# Patient Record
Sex: MALE | Race: WHITE | Employment: FULL TIME | URBAN - METROPOLITAN AREA
[De-identification: names, ages, dates, MRNs, and addresses within clinical notes are randomized per-mention and may not be internally consistent; named-entity substitution may affect disease eponyms.]

---

## 2017-01-02 ENCOUNTER — OFFICE VISIT (OUTPATIENT)
Dept: UROLOGY | Facility: CLINIC | Age: 57
End: 2017-01-02
Payer: COMMERCIAL

## 2017-01-02 VITALS — RESPIRATION RATE: 12 BRPM | DIASTOLIC BLOOD PRESSURE: 80 MMHG | HEART RATE: 88 BPM | SYSTOLIC BLOOD PRESSURE: 126 MMHG

## 2017-01-02 DIAGNOSIS — C61 PROSTATE CANCER (H): Primary | ICD-10-CM

## 2017-01-02 PROCEDURE — 99024 POSTOP FOLLOW-UP VISIT: CPT | Performed by: UROLOGY

## 2017-01-02 NOTE — NURSING NOTE
"Chief Complaint   Patient presents with     RECHECK       Initial /80 mmHg  Pulse 88  Resp 12 Estimated body mass index is 35.86 kg/(m^2) as calculated from the following:    Height as of 12/9/16: 1.689 m (5' 6.5\").    Weight as of 12/23/16: 102.286 kg (225 lb 8 oz).  BP completed using cuff size: regular  Lita Appiah RN       "

## 2017-01-02 NOTE — MR AVS SNAPSHOT
"              After Visit Summary   2017    Estuardo Bowden    MRN: [id number]           Patient Information     Date Of Birth          1960        Visit Information        Provider Department      2017 10:30 AM Ross Rust MD [facility]        Today's Diagnoses     Prostate cancer (H)    -  1        Follow-ups after your visit        Future tests that were ordered for you today     Open Future Orders        Priority Expected Expires Ordered    PSA tumor marker Routine 2017 1/3/2018 2017            Who to contact     If you have questions or need follow up information about today's clinic visit or your schedule please contact [facility] directly at [phone].  Normal or non-critical lab and imaging results will be communicated to you by MyChart, letter or phone within 4 business days after the clinic has received the results. If you do not hear from us within 7 days, please contact the clinic through [employer] or phone. If you have a critical or abnormal lab result, we will notify you by phone as soon as possible.  Submit refill requests through [email or web address] or call your pharmacy and they will forward the refill request to us. Please allow 3 business days for your refill to be completed.          Additional Information About Your Visit        MyChart Information     [email or web address] lets you send messages to your doctor, view your test results, renew your prescriptions, schedule appointments and more. To sign up, go to [email or web address] . Click on \"Log in\" on the left side of the screen, which will take you to the Welcome page. Then click on \"Sign up Now\" on the right side of the page.     You will be asked to enter the access code listed below, as well as some personal information. Please follow the directions to create your username and password.     Your access code is: [email or web address]  Expires: 3/29/2017  9:09 AM     Your access code will  in 90 days. If " you need help or a new code, please call your [place] clinic or [phone].        Your Vitals Were     Pulse Respirations                88 12           Blood Pressure from Last 3 Encounters:   01/02/17 126/80   12/23/16 122/70   12/09/16 138/82    Weight from Last 3 Encounters:   12/23/16 102.286 kg (225 lb 8 oz)   12/09/16 99.292 kg (218 lb 14.4 oz)   11/15/16 96.435 kg (212 lb 9.6 oz)               Primary Care Provider Office Phone # Fax #    Hernandez Luna [phone]       [facility] [address] [facility] [address]        Thank you!     Thank you for choosing [facility]  for your care. Our goal is always to provide you with excellent care. Hearing back from our patients is one way we can continue to improve our services. Please take a few minutes to complete the written survey that you may receive in the mail after your visit with us. Thank you!             Your Updated Medication List - Protect others around you: Learn how to safely use, store and throw away your medicines at www.disposemymeds.org.          This list is accurate as of: 1/2/17 10:34 AM.  Always use your most recent med list.                   Brand Name Dispense Instructions for use    bacitracin 500 UNIT/GM Oint      Apply 1 each topically       ciprofloxacin 500 MG tablet    CIPRO     Take 500 mg by mouth       docusate sodium 100 MG capsule    COLACE     Take 100 mg by mouth       polyethylene glycol powder    MIRALAX    510 g    Take 17 g (1 capful) by mouth 2 times daily

## 2017-04-12 DIAGNOSIS — C61 PROSTATE CANCER (H): ICD-10-CM

## 2017-04-12 LAB — PSA SERPL-MCNC: 0.28 UG/L (ref 0–4)

## 2017-04-12 PROCEDURE — 36415 COLL VENOUS BLD VENIPUNCTURE: CPT | Performed by: UROLOGY

## 2017-04-12 PROCEDURE — 84153 ASSAY OF PSA TOTAL: CPT | Performed by: UROLOGY

## 2017-04-14 ENCOUNTER — TELEPHONE (OUTPATIENT)
Dept: UROLOGY | Facility: OTHER | Age: 57
End: 2017-04-14

## 2017-04-14 DIAGNOSIS — R30.0 DYSURIA: ICD-10-CM

## 2017-04-14 DIAGNOSIS — R30.0 DYSURIA: Primary | ICD-10-CM

## 2017-04-14 LAB
ALBUMIN UR-MCNC: NEGATIVE MG/DL
APPEARANCE UR: CLEAR
BACTERIA #/AREA URNS HPF: ABNORMAL /HPF
BILIRUB UR QL STRIP: NEGATIVE
COLOR UR AUTO: YELLOW
GLUCOSE UR STRIP-MCNC: NEGATIVE MG/DL
HGB UR QL STRIP: ABNORMAL
KETONES UR STRIP-MCNC: NEGATIVE MG/DL
LEUKOCYTE ESTERASE UR QL STRIP: ABNORMAL
NITRATE UR QL: POSITIVE
PH UR STRIP: 5.5 PH (ref 5–7)
RBC #/AREA URNS AUTO: ABNORMAL /HPF (ref 0–2)
SP GR UR STRIP: 1.02 (ref 1–1.03)
URN SPEC COLLECT METH UR: ABNORMAL
UROBILINOGEN UR STRIP-ACNC: 0.2 EU/DL (ref 0.2–1)
WBC #/AREA URNS AUTO: ABNORMAL /HPF (ref 0–2)

## 2017-04-14 PROCEDURE — 87086 URINE CULTURE/COLONY COUNT: CPT | Performed by: UROLOGY

## 2017-04-14 PROCEDURE — 87088 URINE BACTERIA CULTURE: CPT | Performed by: UROLOGY

## 2017-04-14 PROCEDURE — 81001 URINALYSIS AUTO W/SCOPE: CPT | Performed by: UROLOGY

## 2017-04-14 PROCEDURE — 87186 SC STD MICRODIL/AGAR DIL: CPT | Performed by: UROLOGY

## 2017-04-14 NOTE — TELEPHONE ENCOUNTER
Called and spoke to patient.  Per Dr. Rust patient to increase fluid intake and leave a urine sample.  Patient agrees with this plan will call with results. Lita Appiah RN

## 2017-04-14 NOTE — TELEPHONE ENCOUNTER
Reason for call:  Symptom  Reason for call:  Patient reporting a symptom    Symptom or request: pain when urinating    Duration (how long have symptoms been present): couple weeks    Have you been treated for this before? Yes    Additional comments: pt had prostate removal in December and for a couple weeks he has been having some pain when urinating. He wants to know if this is something concerning or not. He does  in 2 weeks but it kind of hurts so he wants to be checked out sooner.     Phone Number patient can be reached at:  Cell number on file:    Telephone Information:   Mobile [phone]       Best Time:  anytime    Can we leave a detailed message on this number:  YES    Call taken on 4/14/2017 at 10:36 AM by Korin Casas

## 2017-04-17 DIAGNOSIS — R30.0 DYSURIA: Primary | ICD-10-CM

## 2017-04-17 LAB
BACTERIA SPEC CULT: ABNORMAL
MICRO REPORT STATUS: ABNORMAL
MICROORGANISM SPEC CULT: ABNORMAL
SPECIMEN SOURCE: ABNORMAL

## 2017-04-17 RX ORDER — CEPHALEXIN 500 MG/1
500 CAPSULE ORAL 3 TIMES DAILY
Qty: 15 CAPSULE | Refills: 0 | Status: SHIPPED | OUTPATIENT
Start: 2017-04-17 | End: 2017-04-22

## 2017-04-26 ENCOUNTER — OFFICE VISIT (OUTPATIENT)
Dept: UROLOGY | Facility: OTHER | Age: 57
End: 2017-04-26
Payer: COMMERCIAL

## 2017-04-26 VITALS — HEART RATE: 76 BPM | DIASTOLIC BLOOD PRESSURE: 84 MMHG | SYSTOLIC BLOOD PRESSURE: 130 MMHG

## 2017-04-26 DIAGNOSIS — C61 PROSTATE CANCER (H): Primary | ICD-10-CM

## 2017-04-26 DIAGNOSIS — R39.198 SLOWING OF URINARY STREAM: ICD-10-CM

## 2017-04-26 PROCEDURE — 52000 CYSTOURETHROSCOPY: CPT | Performed by: UROLOGY

## 2017-04-26 PROCEDURE — 51798 US URINE CAPACITY MEASURE: CPT | Performed by: UROLOGY

## 2017-04-26 PROCEDURE — 99214 OFFICE O/P EST MOD 30 MIN: CPT | Mod: 25 | Performed by: UROLOGY

## 2017-04-26 NOTE — MR AVS SNAPSHOT
After Visit Summary   4/26/2017    Estuardo Bowden    MRN: [id number]           Patient Information     Date Of Birth          1960        Visit Information        Provider Department      4/26/2017 8:15 AM Ross Rust MD [facility]        Today's Diagnoses     Prostate cancer (H)    -  1    Slowing of urinary stream           Follow-ups after your visit        Additional Services     RAD ONCOLOGY REFERRAL       Your provider has referred you to: [facility] [phone]    Please be aware that coverage of these services is subject to the terms and limitations of your health insurance plan.  Call member services at your health plan with any benefit or coverage questions.      Please bring the following with you to your appointment:    (1) Any X-Rays, CTs or MRIs which have been performed.  Contact the facility where they were done to arrange for  prior to your scheduled appointment.    (2) List of current medications   (3) This referral request   (4) Any documents/labs given to you for this referral                  Your next 10 appointments already scheduled     May 11, 2017   Procedure with Ross Rust MD   [facility] (--)    [address] [place] [facility] [address] [phone]              Who to contact     If you have questions or need follow up information about today's clinic visit or your schedule please contact [facility] directly at [phone].  Normal or non-critical lab and imaging results will be communicated to you by MyChart, letter or phone within 4 business days after the clinic has received the results. If you do not hear from us within 7 days, please contact the clinic through MyChart or phone. If you have a critical or abnormal lab result, we will notify you by phone as soon as possible.  Submit refill requests through [employer] or call your pharmacy and they will forward the refill request  "to us. Please allow 3 business days for your refill to be completed.          Additional Information About Your Visit        MyChart Information     [employer] lets you send messages to your doctor, view your test results, renew your prescriptions, schedule appointments and more. To sign up, go to [email or web address] . Click on \"Log in\" on the left side of the screen, which will take you to the Welcome page. Then click on \"Sign up Now\" on the right side of the page.     You will be asked to enter the access code listed below, as well as some personal information. Please follow the directions to create your username and password.     Your access code is: [email or web address]  Expires: 2017  8:33 AM     Your access code will  in 90 days. If you need help or a new code, please call your [place] clinic or [phone].        Care EveryWhere ID     This is your Care EveryWhere ID. This could be used by other organizations to access your [facility] medical records  [id number]        Your Vitals Were     Pulse                   76            Blood Pressure from Last 3 Encounters:   17 130/84   17 126/80   16 122/70    Weight from Last 3 Encounters:   16 102.3 kg (225 lb 8 oz)   16 99.3 kg (218 lb 14.4 oz)   11/15/16 96.4 kg (212 lb 9.6 oz)              We Performed the Following     CYSTOURETHROSCOPY     MEASURE POST-VOID RESIDUAL URINE/BLADDER CAPACITY, US NON-IMAGING     RAD ONCOLOGY REFERRAL        Primary Care Provider Office Phone # Fax #    Hernandez Luna [phone]       [facility] [place] [address] [facility] [address]        Thank you!     Thank you for choosing [facility]  for your care. Our goal is always to provide you with excellent care. Hearing back from our patients is one way we can continue to improve our services. Please take a few minutes to complete the written survey that you may receive in the mail after your " visit with us. Thank you!             Your Updated Medication List - Protect others around you: Learn how to safely use, store and throw away your medicines at www.disposemymeds.org.          This list is accurate as of: 4/26/17  8:33 AM.  Always use your most recent med list.                   Brand Name Dispense Instructions for use    bacitracin 500 UNIT/GM Oint      Apply 1 each topically Reported on 4/26/2017       docusate sodium 100 MG capsule    COLACE     Take 100 mg by mouth Reported on 4/26/2017       polyethylene glycol powder    MIRALAX    510 g    Take 17 g (1 capful) by mouth 2 times daily

## 2017-04-26 NOTE — NURSING NOTE
"Chief Complaint   Patient presents with     Results       Initial /84 (BP Location: Left arm, Patient Position: Chair, Cuff Size: Adult Large)  Pulse 76 Estimated body mass index is 35.85 kg/(m^2) as calculated from the following:    Height as of 12/9/16: 1.689 m (5' 6.5\").    Weight as of 12/23/16: 102.3 kg (225 lb 8 oz).  Medication Reconciliation: complete   Brittany Pope CMA      "

## 2017-04-26 NOTE — PROGRESS NOTES
Bladder Scan performed. 304 mL maximum residual urine detected after 3 scans. MD informed.    Sade Catherine CMA ([facility])

## 2017-04-26 NOTE — PROGRESS NOTES
Chief Complaint   Patient presents with     Results       Estuardo Bowden is a 56 year old male who presents today for follow up of   Chief Complaint   Patient presents with     Results    f/u post RRP.  He has recent UTI which was treated.  He c/o frequency and slow urinary stream.  Recent psa is 0.28.    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 No family hx of      Coronary Artery Disease No family hx of      Breast Cancer No family hx of      Ovarian Cancer No family hx of      Mental Illness No family hx of      Asthma No family hx of      Obesity No family hx of      Unknown/Adopted No family hx of      Anesthesia Reaction No family hx of      CEREBROVASCULAR DISEASE No family hx of      Other Cancer No family hx of      Prostate Cancer No family hx of      Cancer - colorectal No family hx of      Hyperlipidemia No family hx of      Known Genetic Syndrome No family hx of      OSTEOPOROSIS No family hx of      Depression No family hx of      Anxiety Disorder No family hx of       MENTAL ILLNESS No family hx of      Substance Abuse No family hx of      Thyroid Disease No family hx of      Genetic Disorder No family hx of      Colon Cancer No family hx of      Hypertension Mother      Social History     Social History     Marital Status:      Spouse Name: N/A     Number of Children: 2     Years of Education: N/A     Occupational History      [employer] [place]     Social History Main Topics     Smoking status: Former Smoker     Types: Dip, chew, snus or snuff     Smokeless tobacco: Current User     Types: Chew      Comment: 1 tin every 3 days     Alcohol Use: 0.0 oz/week     0 Standard drinks or equivalent per week      Comment: 6-12 beers per week     Drug Use: No     Sexual Activity:     Partners: Female      Comment: no protection     Other Topics Concern     Parent/Sibling W/ Cabg, Mi Or Angioplasty Before 65f 55m? No      Service No     Blood Transfusions No     Caffeine Concern No     Occupational Exposure No     Hobby Hazards No     Sleep Concern No     Stress Concern No     Weight Concern No     Special Diet No     Back Care No     Exercise Yes     [employer] 12+ times per month     Seat Belt Yes     Self-Exams Yes     Social History Narrative       REVIEW OF SYSTEMS  =================  C: NEGATIVE for fever, chills, change in weight  I: NEGATIVE for worrisome rashes, moles or lesions  E/M: NEGATIVE for ear, mouth and throat problems  R: NEGATIVE for significant cough or SHORTNESS OF BREATH,   CV: NEGATIVE for chest pain, palpitations or peripheral edema  GI: NEGATIVE for nausea, abdominal pain, heartburn, or change in bowel habits  NEURO: NEGATIVE any motor/sensory changes  PSYCH: NEGATIVE for recent mood disorder    Physical Exam:  /84 (BP Location: Left arm, Patient Position: Chair, Cuff Size: Adult Large)  Pulse 76   Patient is pleasant, in no acute distress, good general condition.  Lung: no evidence of respiratory distress    Abdomen: Soft, nondistended,  non tender. No masses. No rebound or guarding.   Exam: incision c/d/i.  Bladder scan over 300 ml  Skin: Warm and dry.  No redness.  Psych: normal mood and affect  Neuro: alert and oriented  Final Diagnosis   A) LYMPH NODES, RIGHT PELVIC, REGIONAL DISSECTION:  One lymph node, negative for metastatic carcinoma (0/1)    B) LYMPH NODES, LEFT PELVIC, REGIONAL DISSECTION:  One lymph node, negative for metastatic carcinoma (0/1)    C) PROSTATE, RADICAL PROSTATECTOMY:  1. Prostatic adenocarcinoma, Agustina s grade 4+4 (8/10)  2. Extraprostatic extension by tumor is present at the left base  3. Tumor involves the muscularis of the seminal vesicle bilaterally  4. The surgical margins are negative for tumor  5. Negative for lymphatic/vascular invasion by tumor  6. Please see prostate cancer staging parameters below    Cysto done today    Patient is draped and prepped.  Flexible cystoscopy placed under direct vision.      The anterior urethra is normal   The prostatic urethra is missing with bladder neck contracture seen about 12 F.          Assessment/Plan:  (C61) Prostate cancer (H)  (primary encounter diagnosis)  Comment: discussed psa result  Plan: referral to Radiation Oncology for salvage XRT           Discussed hormonal therapy for 2 years also    (R31.512) Slowing of urinary stream  Comment: bladder neck contracture  Plan: cysto and balloon dilation

## 2017-04-26 NOTE — PROGRESS NOTES
[facility] [place]  [address], [address]  [place] [address]  [phone]  Dept: [phone]    PRE-OP EVALUATION:  Today's date: 5/3/2017    Estuardo Bowden (: 1960) presents for pre-operative evaluation assessment as requested by Dr. Rust.  He requires evaluation and anesthesia risk assessment prior to undergoing surgery/procedure for treatment of postoperative urethral stricture following prostatectomy for prostate cancer. .  Proposed procedure: Cystoscopy with dilation of the urethra    Date of Surgery/ Procedure: 2017  Time of Surgery/ Procedure: 1:30pm  Hospital/Surgical Facility: [facility]  Fax number for surgical facility:   Primary Physician: Hernandez Luna  Type of Anesthesia Anticipated: General    Patient has a Health Care Directive or Living Will:  YES -on file     1. NO - Do you have a history of heart attack, stroke, stent, bypass or surgery on an artery in the head, neck, heart or legs?  2. NO - Do you ever have any pain or discomfort in your chest?  3. NO - Do you have a history of  Heart Failure?  4. NO - Are you troubled by shortness of breath when: walking on the level, up a slight hill or at night?  5. NO - Do you currently have a cold, bronchitis or other respiratory infection?  6. NO - Do you have a cough, shortness of breath or wheezing?  7. NO - Do you sometimes get pains in the calves of your legs when you walk?  8. NO - Do you or anyone in your family have previous history of blood clots?  9. NO - Do you or does anyone in your family have a serious bleeding problem such as prolonged bleeding following surgeries or cuts?  10. NO - Have you ever had problems with anemia or been told to take iron pills?  11. NO - Have you had any abnormal blood loss such as black, tarry or bloody stools, or abnormal vaginal bleeding?  12. YES - HAVE YOU EVER HAD A BLOOD TRANSFUSION? Following the prostatectomy required a blood transfusion   13.  NO - Have you or any of your relatives ever had problems with anesthesia?  14. NO - Do you have sleep apnea, excessive snoring or daytime drowsiness?  15. NO - Do you have any prosthetic heart valves?  16. NO - Do you have prosthetic joints?  17. NO - Is there any chance that you may be pregnant?      HPI:                                                      Brief HPI related to upcoming procedure:     Estuardo Bowden is a 57 y/o patient seen for preoperative evaluation prior to undergoing cystoscopy and urethral dilation for treatment of a symptomatic urethral stricture. He has had ongoing symptoms of obstruction since his prostatectomy in December 2016 for prostate cancer. He will also begin post surgery radiation this week to the surgical bed.      See problem list for active medical problems.  Problems all longstanding and stable, except as noted/documented.  See ROS for pertinent symptoms related to these conditions.                                                                                                  .    MEDICAL HISTORY:                                                      Patient Active Problem List    Diagnosis Date Noted     S/P complete repair of rotator cuff 10/18/2016     Priority: Medium     Biceps tendonitis, right 09/08/2016     Priority: Medium     AC (acromioclavicular) joint arthritis 09/08/2016     Priority: Medium     Complete tear of right rotator cuff 09/08/2016     Priority: Medium     Prostate cancer (H) 05/14/2015     Priority: Medium     ED (erectile dysfunction) 04/22/2015     Priority: Medium     Tobacco use disorder 06/11/2013     Priority: Medium     CARDIOVASCULAR SCREENING; LDL GOAL LESS THAN 130 10/31/2010     Priority: Medium      Past Medical History:   Diagnosis Date     ED (erectile dysfunction)      Guillain-[place] syndrome (H) 1999    no sequale     Prostate cancer (H) 5/2015     Past Surgical History:   Procedure Laterality Date     ARTHROTOMY SHOULDER, ROTATOR CUFF  "REPAIR, COMBINED Right 10/7/2016    Right RCR, Russell Farley     NO HISTORY OF SURGERY       SIGMOIDOSCOPY FLEXIBLE  7/15/2013    Procedure: SIGMOIDOSCOPY FLEXIBLE;  Sigmoidoscopy Flexible;  Surgeon: Dusty Chang MD;  Location:  GI     No current outpatient prescriptions on file.     OTC products: None, except as noted above    No Known Allergies   Latex Allergy: NO    Social History   Substance Use Topics     Smoking status: Former Smoker     Types: Dip, chew, snus or snuff     Smokeless tobacco: Current User     Types: Chew      Comment: 1 tin every 3 days     Alcohol use 0.0 oz/week     0 Standard drinks or equivalent per week      Comment: 6-12 beers per week     History   Drug Use No       REVIEW OF SYSTEMS:                                                    C: NEGATIVE for fever, chills, change in weight  I: NEGATIVE for worrisome rashes, moles or lesions  E: NEGATIVE for vision changes or irritation  E/M: NEGATIVE for ear, mouth and throat problems  R: NEGATIVE for significant cough or SOB  CV: NEGATIVE for chest pain, palpitations or peripheral edema  CV: No known cardiac issues noted.  GI: NEGATIVE for nausea, abdominal pain, heartburn, or change in bowel habits   male :As above  M: NEGATIVE for significant arthralgias or myalgia  N: NEGATIVE for weakness, dizziness or paresthesias  E: NEGATIVE for temperature intolerance, skin/hair changes  H: NEGATIVE for bleeding problems  P: NEGATIVE for changes in mood or affect    EXAM:                                                    /70 (BP Location: Left arm, Cuff Size: Adult Large)  Pulse 84  Temp 97.5  F (36.4  C) (Temporal)  Resp 20  Ht 5' 7\" (1.702 m)  Wt 213 lb (96.6 kg)  BMI 33.36 kg/m2    GENERAL APPEARANCE: healthy, alert and no distress    GENERAL APPEARANCE: over weight     EYES: EOMI,  PERRL     HENT: ear canals and TM's normal and nose and mouth without ulcers or lesions     NECK: no adenopathy, no asymmetry, masses, or scars " and thyroid normal to palpation     RESP: lungs clear to auscultation - no rales, rhonchi or wheezes     CV: regular rates and rhythm, normal S1 S2, no S3 or S4 and no murmur, click or rub     ABDOMEN:  soft, nontender, no HSM or masses and bowel sounds normal     Rectal exam: deferred     GU_male: testicles normal without atrophy or masses and no hernias     MS: extremities normal- no gross deformities noted, no evidence of inflammation in joints, FROM in all extremities.     SKIN: no suspicious lesions or rashes     NEURO: Normal strength and tone, sensory exam grossly normal, mentation intact and speech normal     PSYCH: mentation appears normal. and affect normal/bright     LYMPHATICS: No axillary, cervical, or supraclavicular nodes    DIAGNOSTICS:                                                    EKG: Not indicated due to non-vascular surgery and low risk of event (age <65 and without cardiac risk factors)    Recent Labs   Lab Test  12/23/16   1203  12/09/16   1415  04/22/15   0829   03/24/10   0953   HGB  8.7*  15.1  16.5   < >  16.5   PLT  245  242  225   < >  213   NA   --    --   138   --   141   POTASSIUM   --    --   4.2   --   4.7   CR   --    --   1.01   --   1.01    < > = values in this interval not displayed.      CBC RESULTS:   Recent Labs   Lab Test  05/03/17   0959   WBC  7.4   RBC  4.74   HGB  15.3   HCT  44.2   MCV  93   MCH  32.3   MCHC  34.6   RDW  15.3*   PLT  243       IMPRESSION:                                                    Reason for surgery/procedure:     Postoperative urethral stricture // Cystoscopy with urethral dilation    Diagnosis/reason for consult:     (Z01.818) Preop general physical exam  (primary encounter diagnosis)  Comment: As noted--cleared for proposed surgery  Plan: CBC with platelets            (N99.114) Postprocedural male urethral stricture  Comment: As noted  Plan: proposed surgery    (C61) Prostate cancer (H)  Comment: Post prostatectomy  Plan: Local field  radiation planned     (D64.89) Anemia due to other cause  Comment: Checking levels--was from blood loss in surgery  Plan: CBC with platelets                The proposed surgical procedure is considered INTERMEDIATE risk.    REVISED CARDIAC RISK INDEX  The patient has the following serious cardiovascular risks for perioperative complications such as (MI, PE, VFib and 3  AV Block):  No serious cardiac risks  INTERPRETATION: 0 risks: Class I (very low risk - 0.4% complication rate)    The patient has the following additional risks for perioperative complications:  No identified additional risks        RECOMMENDATIONS:                                                      --Consult hospital rounder / IM to assist post-op medical management      APPROVAL GIVEN to proceed with proposed procedure, without further diagnostic evaluation       Signed Electronically by: Hernandez Luna MD    Copy of this evaluation report is provided to requesting physician.    [facility] Preop Guidelines

## 2017-05-03 ENCOUNTER — OFFICE VISIT (OUTPATIENT)
Dept: FAMILY MEDICINE | Facility: CLINIC | Age: 57
End: 2017-05-03
Payer: COMMERCIAL

## 2017-05-03 VITALS
BODY MASS INDEX: 33.43 KG/M2 | RESPIRATION RATE: 20 BRPM | HEART RATE: 84 BPM | WEIGHT: 213 LBS | SYSTOLIC BLOOD PRESSURE: 138 MMHG | DIASTOLIC BLOOD PRESSURE: 70 MMHG | HEIGHT: 67 IN | TEMPERATURE: 97.5 F

## 2017-05-03 DIAGNOSIS — Z01.818 PREOP GENERAL PHYSICAL EXAM: Primary | ICD-10-CM

## 2017-05-03 DIAGNOSIS — D64.89 ANEMIA DUE TO OTHER CAUSE: ICD-10-CM

## 2017-05-03 DIAGNOSIS — C61 PROSTATE CANCER (H): ICD-10-CM

## 2017-05-03 DIAGNOSIS — N99.114 POSTPROCEDURAL MALE URETHRAL STRICTURE: ICD-10-CM

## 2017-05-03 LAB
ERYTHROCYTE [DISTWIDTH] IN BLOOD BY AUTOMATED COUNT: 15.3 % (ref 10–15)
HCT VFR BLD AUTO: 44.2 % (ref 40–53)
HGB BLD-MCNC: 15.3 G/DL (ref 13.3–17.7)
MCH RBC QN AUTO: 32.3 PG (ref 26.5–33)
MCHC RBC AUTO-ENTMCNC: 34.6 G/DL (ref 31.5–36.5)
MCV RBC AUTO: 93 FL (ref 78–100)
PLATELET # BLD AUTO: 243 10E9/L (ref 150–450)
RBC # BLD AUTO: 4.74 10E12/L (ref 4.4–5.9)
WBC # BLD AUTO: 7.4 10E9/L (ref 4–11)

## 2017-05-03 PROCEDURE — 85027 COMPLETE CBC AUTOMATED: CPT | Performed by: FAMILY MEDICINE

## 2017-05-03 PROCEDURE — 99214 OFFICE O/P EST MOD 30 MIN: CPT | Performed by: FAMILY MEDICINE

## 2017-05-03 PROCEDURE — 36415 COLL VENOUS BLD VENIPUNCTURE: CPT | Performed by: FAMILY MEDICINE

## 2017-05-03 ASSESSMENT — PAIN SCALES - GENERAL: PAINLEVEL: NO PAIN (0)

## 2017-05-03 NOTE — MR AVS SNAPSHOT
After Visit Summary   5/3/2017    Estuardo Bowden    MRN: [id number]           Patient Information     Date Of Birth          1960        Visit Information        Provider Department      5/3/2017 9:40 AM Hernandez Luna MD [facility]        Today's Diagnoses     Preop general physical exam    -  1    Postprocedural male urethral stricture        Prostate cancer (H)        Anemia due to other cause          Care Instructions      Before Your Surgery      Call your surgeon if there is any change in your health. This includes signs of a cold or flu (such as a sore throat, runny nose, cough, rash or fever).    Do not smoke, drink alcohol or take over the counter medicine (unless your surgeon or primary care doctor tells you to) for the 24 hours before and after surgery.    If you take prescribed drugs: Follow your doctor s orders about which medicines to take and which to stop until after surgery.    Eating and drinking prior to surgery: follow the instructions from your surgeon    Take a shower or bath the night before surgery. Use the soap your surgeon gave you to gently clean your skin. If you do not have soap from your surgeon, use your regular soap. Do not shave or scrub the surgery site.  Wear clean pajamas and have clean sheets on your bed.         Follow-ups after your visit        Your next 10 appointments already scheduled     May 10, 2017 10:00 AM CDT   [facility] Visit [facility] Celestine Gray MD   [facility]    [id number] [place] [address]   [phone]            May 10, 2017 11:30 AM CDT   Ct Sim with Celestine Gray MD, MG RT SIMULATION   [facility]    [address] [place] [address]   [phone]            May 11, 2017   Procedure with Ross Rust MD   [facility])    [address] [place], [place] [address]Britta Comergabriel [place]  "[id number] [phone]              Who to contact     If you have questions or need follow up information about today's clinic visit or your schedule please contact [facility] directly at [phone].  Normal or non-critical lab and imaging results will be communicated to you by MyChart, letter or phone within 4 business days after the clinic has received the results. If you do not hear from us within 7 days, please contact the clinic through MyChart or phone. If you have a critical or abnormal lab result, we will notify you by phone as soon as possible.  Submit refill requests through [employer] or call your pharmacy and they will forward the refill request to us. Please allow 3 business days for your refill to be completed.          Additional Information About Your Visit        [employer] Information     [employer] lets you send messages to your doctor, view your test results, renew your prescriptions, schedule appointments and more. To sign up, go to [email or web address] . Click on \"Log in\" on the left side of the screen, which will take you to the Welcome page. Then click on \"Sign up Now\" on the right side of the page.     You will be asked to enter the access code listed below, as well as some personal information. Please follow the directions to create your username and password.     Your access code is: [email or web address]  Expires: 2017  8:33 AM     Your access code will  in 90 days. If you need help or a new code, please call your [facility] clinic or [phone].        Care EveryWhere ID     This is your Care EveryWhere ID. This could be used by other organizations to access your [facility] medical records  [id number]        Your Vitals Were     Pulse Temperature Respirations Height BMI (Body Mass Index)       84 97.5  F (36.4  C) (Temporal) 20 5' 7\" (1.702 m) 33.36 kg/m2        Blood Pressure from Last 3 Encounters:   17 138/70   17 130/84   17 126/80    Weight from Last " 3 Encounters:   05/03/17 213 lb (96.6 kg)   12/23/16 225 lb 8 oz (102.3 kg)   12/09/16 218 lb 14.4 oz (99.3 kg)              We Performed the Following     CBC with platelets          Today's Medication Changes          These changes are accurate as of: 5/3/17  9:57 AM.  If you have any questions, ask your nurse or doctor.               Stop taking these medicines if you haven't already. Please contact your care team if you have questions.     bacitracin 500 UNIT/GM Oint   Stopped by:  Hernandez Luna MD           docusate sodium 100 MG capsule   Commonly known as:  COLACE   Stopped by:  Hernandez Luna MD           polyethylene glycol powder   Commonly known as:  MIRALAX   Stopped by:  Hernandez Luna MD                    Primary Care Provider Office Phone # Fax #    Hernandez Luna [phone]       [facility] [address] [place] [address]        Thank you!     Thank you for choosing [facility]  for your care. Our goal is always to provide you with excellent care. Hearing back from our patients is one way we can continue to improve our services. Please take a few minutes to complete the written survey that you may receive in the mail after your visit with us. Thank you!             Your Updated Medication List - Protect others around you: Learn how to safely use, store and throw away your medicines at www.disposemymeds.org.      Notice  As of 5/3/2017  9:57 AM    You have not been prescribed any medications.

## 2017-05-03 NOTE — NURSING NOTE
"Chief Complaint   Patient presents with     Pre-Op Exam     Panel Management     MyChart, Tobacco Cessation       Initial /70 (BP Location: Left arm, Cuff Size: Adult Large)  Pulse 84  Temp 97.5  F (36.4  C) (Temporal)  Resp 20  Ht 5' 7\" (1.702 m)  Wt 213 lb (96.6 kg)  BMI 33.36 kg/m2 Estimated body mass index is 33.36 kg/(m^2) as calculated from the following:    Height as of this encounter: 5' 7\" (1.702 m).    Weight as of this encounter: 213 lb (96.6 kg).  Medication Reconciliation: complete       Chas Wells MA    "

## 2017-05-10 ENCOUNTER — OFFICE VISIT (OUTPATIENT)
Dept: RADIATION ONCOLOGY | Facility: CLINIC | Age: 57
End: 2017-05-10
Payer: COMMERCIAL

## 2017-05-10 ENCOUNTER — APPOINTMENT (OUTPATIENT)
Dept: RADIATION ONCOLOGY | Facility: CLINIC | Age: 57
End: 2017-05-10
Payer: COMMERCIAL

## 2017-05-10 ENCOUNTER — ANESTHESIA EVENT (OUTPATIENT)
Dept: SURGERY | Facility: AMBULATORY SURGERY CENTER | Age: 57
End: 2017-05-10

## 2017-05-10 VITALS
DIASTOLIC BLOOD PRESSURE: 80 MMHG | TEMPERATURE: 98.4 F | WEIGHT: 218.8 LBS | BODY MASS INDEX: 34.34 KG/M2 | SYSTOLIC BLOOD PRESSURE: 148 MMHG | HEART RATE: 85 BPM | OXYGEN SATURATION: 94 % | HEIGHT: 67 IN | RESPIRATION RATE: 18 BRPM

## 2017-05-10 DIAGNOSIS — C61 MALIGNANT NEOPLASM OF PROSTATE (H): Primary | ICD-10-CM

## 2017-05-10 PROCEDURE — 77263 THER RADIOLOGY TX PLNG CPLX: CPT | Performed by: RADIOLOGY

## 2017-05-10 PROCEDURE — 99204 OFFICE O/P NEW MOD 45 MIN: CPT | Mod: 25 | Performed by: RADIOLOGY

## 2017-05-10 PROCEDURE — 77334 RADIATION TREATMENT AID(S): CPT | Performed by: RADIOLOGY

## 2017-05-10 ASSESSMENT — ENCOUNTER SYMPTOMS
FEVER: 0
FREQUENCY: 1
HEMATURIA: 0
MUSCULOSKELETAL NEGATIVE: 1
NEUROLOGICAL NEGATIVE: 1
GASTROINTESTINAL NEGATIVE: 1
DYSURIA: 1
DIAPHORESIS: 0
MEMORY LOSS: 0
HALLUCINATIONS: 0
NERVOUS/ANXIOUS: 1
RESPIRATORY NEGATIVE: 1
FLANK PAIN: 0
CARDIOVASCULAR NEGATIVE: 1
CHILLS: 0
EYES NEGATIVE: 1
INSOMNIA: 0
DEPRESSION: 0
WEAKNESS: 0
WEIGHT LOSS: 0

## 2017-05-10 ASSESSMENT — LIFESTYLE VARIABLES: SUBSTANCE_ABUSE: 0

## 2017-05-10 ASSESSMENT — PAIN SCALES - GENERAL: PAINLEVEL: NO PAIN (0)

## 2017-05-10 NOTE — PROGRESS NOTES
RADIATION ONCOLOGY CONSULT NOTE  Date of Visit: May 10, 2017    Estuardo Bowden  MRN: [id number]  : 1960    Estuardo Bowden is being seen today for initial consultation at the request of Dr. Ross Rust for consideration of radiation therapy for high risk prostate cancer, s/p prostatectomy. All pertinent labs, imaging, and pathology findings have been reviewed.       HISTORY OF PRESENT ILLNESS:  Mr. Bowden is a 57 year old male who had an elevated PSA in .  Bx confirmed Agustina 6 (3+3) CaP w/ no PNI and no LVI.  PSA continued to rise, prompting another bx in :  [place] 7(3+4) now w/ PNI.  No LVI seen.  Dr. Rust recommended surgery and the patient underwent radical prostatectomy Dec., 2016.  He was seen soon after surgery for a post op check and then released for 4 months.  When he returned to Dr. Rust in , his PSA was 0.28 (only PSA after surgery) and was referred for consideration of postoperative radiation.  The final pathology from surgery is outlined below.     He in unable to control urine flow, with nearly constant leaking for which he wears a pad.  He only gets up 1-2 times per night to urinate, but has constant leaking during the night and is soaked in the morning. He is unable to maintain an erection.      He is scheduled to undergo cystoscopy and dilatation tomorrow and is hopeful that his sx will improve.        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        FAMILY HISTORY:  Family History   Problem Relation Age of Onset     DIABETES Maternal Grandmother      Hypertension Mother      Prostate Cancer Maternal Grandfather      C.A.D. No family hx of      Coronary Artery Disease No family hx of      Breast Cancer No family hx of      Ovarian Cancer No family hx of      Mental Illness No family hx of      Asthma No family hx of      Obesity No family hx of      Unknown/Adopted No family hx of      Anesthesia Reaction No family hx of      CEREBROVASCULAR DISEASE No family hx of      Other Cancer No family hx of      Cancer - colorectal No family hx of      Hyperlipidemia No family hx of      Known Genetic Syndrome No family hx of      OSTEOPOROSIS No family hx of      Depression No family hx of      Anxiety Disorder No family hx of      MENTAL ILLNESS No family hx of      Substance Abuse No family hx of      Thyroid Disease No family hx of      Genetic Disorder No family hx of      Colon Cancer No family hx of        SOCIAL HISTORY:  Social History     Social History     Marital status:      Spouse name: N/A     Number of children: 2     Years of education: N/A     Occupational History      [employer] [place]     Social History Main Topics     Smoking status: Never Smoker     Smokeless tobacco: Current User     Types: Chew      Comment: 1 tin every 3 days     Alcohol use 7.2 oz/week     12 Cans of beer, 0 Standard drinks or equivalent per week      Comment: 12 beers per week     Drug use: No     Sexual activity: Yes     Partners: Female     Birth control/ protection: None     Other Topics Concern     Parent/Sibling W/ Cabg, Mi Or  "Angioplasty Before 65f 55m? No      Service No     Blood Transfusions No     Caffeine Concern No     Occupational Exposure No     Hobby Hazards No     Sleep Concern No     Stress Concern No     Weight Concern No     Special Diet No     Back Care No     Exercise Yes     [employer] 12+ times per month     Seat Belt Yes     Self-Exams Yes     Social History Narrative     The patient's maternal grandfather was diagnosed w/ prostate cancer at some point prior to his death in his late 80's.  There's no other known family history of malignancy.      REVIEW OF SYSTEMS: A 10 point review of systems was obtained. Pertinent findings are noted in the HPI and are otherwise unremarkable.       PHYSICAL EXAM:  VITALS: /80 (BP Location: Left arm, Patient Position: Chair, Cuff Size: Adult Large)  Pulse 85  Temp 98.4  F (36.9  C) (Oral)  Resp 18  Ht 1.702 m (5' 7\")  Wt 99.2 kg (218 lb 12.8 oz)  SpO2 94%  BMI 34.27 kg/m2  GEN: Appears well, alert, oriented, and in NAD  HEENT: NCAT, PERRL, EOMI, normal conjunctiva, MMM, no thrush  NECK: Supple, full ROM, no cervical or clavicular lymphadenopathy  CV: RRR, no murmurs/rubs/gallops, warm and well-perfused  RESP: CTAB, no wheezes/rales/rhonchi, good aeration throughout all lung fields, breathing comfortably on room air  ABDOMEN: Soft, NT, ND, bowel sounds present  : Nl genitalia.  No rectal exam performed today.    SKIN: Normal color and turgor  NEURO: No focal deficits, CN 3-12 grossly intact, normal and symmetric motor and sensory exam in bilateral upper and lower extremities, normal gait  PSYCH: Appropriate mood and affect    ECOG PERFORMANCE STATUS: 1    Radiographic Studies:  Bone scan 7/22/16 revealed no evidence of bony mets.      Pathology Report:  Pathology from surgery Dec., 2016 revealed Agustina 8 (4+4) prostate cancer with extraprostatic extension and involvement of the SV musculature bilaterally.  No LVI,  Margins neg, 2 nodes neg.      Laboratory Studies:  " PSA postop (April 2017)  0.28.  PSA over 20 prior to prostatectomy.    IMPRESSION:  In summary, Mr. Bowden is a 57 year old male who presents with stage [id number] prostate cancer, who's PSA has not fallen into undetectable range and who is at high risk for local recurrence given [place] score, extraprostatic extension and SV involvement.  He's here today to discuss postoperative radiation.        RECOMMENDATION:  I talked w/ Mr. Bowden and his wife after prostate cancer in general and then about his specific situation. He is at high risk for recurrence and I have recommended prostate bed irradiation to start ASAP.  We will simulate him with a full bladder today using a penile clamp.  Hopefully, the procedure tomorrow will result in better urinary control and he'll be able to maintain a full bladder during daily treatments.  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  That may be the best we'll be able to do to protect his bladder.  We talked about the potential benefits and risks of treatment and he wishes to proceed.  We talked about logistics of treatment.  He works nearby which will make this a bit easier for him.                Gina Navarrete M.D.  Radiation Oncology

## 2017-05-10 NOTE — MR AVS SNAPSHOT
After Visit Summary   5/10/2017    Estuardo Bowden    MRN: [id number]           Patient Information     Date Of Birth          1960        Visit Information        Provider Department      5/10/2017 10:00 AM Gina Navarrete MD [facility]        Today's Diagnoses     Malignant neoplasm of prostate (H)    -  1      Care Instructions          What to expect at your Simulation visit:    You will meet with a Radiation Therapist and other team members who will be doing a planning session called a  simulation  with you. This process will determine your daily treatment.    ~ You will lie on a flat table and have a treatment planning CT scan.  It is important during the scan to hold very still and breathe normally.    ~ Your therapist may construct a body mold to help you hold still for your treatments.    ~ If you are having treatment to the head or neck area you will be fitted with a plastic mesh mask that fits very snugly over your face and neck.     ~ Your therapist will be taking some digital photos that will go in your treatment chart.      ~Your therapist will make marks on your skin and take measurements. Your therapist may ask you about making small tattoos (a permanent small dot) over these marks.  These marks are used to position you daily for your radiation therapy treatments. Please do not wash off any marks until all of your radiation therapy treatments are complete unless you are instructed to do so by your therapist.    ~ Once the simulation is completed it can take from 3 to 10 business days before you start radiation therapy treatments.    ~ You may meet with a nurse who will go over management of treatment side effects and self care during your treatments. The nurse will help to plan care with other departments and physicians if needed.      Additional information for simulation of the pelvis area   ~ It is important to have the same amount of urine in  your bladder for each treatment.  In order to prepare for your simulation and for daily treatments, please:  Empty your bladder two hours prior to your scheduled appointment time.  Then drink about 2 to 4 cups of liquid to fill your bladder again.  It is important to drink the same amount of liquid each day.  Don t drink too much- you should be comfortable during your simulation and treatment.  You should not empty your bladder again until the simulation or treatment appointment is complete.  ~ You may have some fluid injected into the tip of your penis for a few minutes during the CT scan to help your doctor see your internal organs.     ~ Please speak with your physician, nurse or therapist if you have any questions regarding the above information.  If you feel you may have difficulties with this preparation, please let us know.  Please contact [facility] RN with questions or concerns following today's appointment: [phone].    Thank you!                                  Follow-ups after your visit        Your next 10 appointments already scheduled     May 11, 2017   Procedure with [place] Rust [place]   [facility] [place]    [address] [place] [facility] [facility] [address] [phone]              Who to contact     If you have questions or need follow up information about today's clinic visit or your schedule please contact [facility] directly at [phone].  Normal or non-critical lab and imaging results will be communicated to you by MyChart, letter or phone within 4 business days after the clinic has received the results. If you do not hear from us within 7 days, please contact the clinic through MyChart or phone. If you have a critical or abnormal lab result, we will notify you by phone as soon as possible.  Submit refill requests through [employer] or call your pharmacy and they will forward the refill request to us. Please allow 3 business days for  "your refill to be completed.          Additional Information About Your Visit        [employer] Information     [employer] is an electronic gateway that provides easy, online access to your medical records. With [employer], you can request a clinic appointment, read your test results, renew a prescription or communicate with your care team.     To sign up for [employer] visit the website at [email or web address]   You will be asked to enter the access code listed below, as well as some personal information. Please follow the directions to create your username and password.     Your access code is: [email or web address]  Expires: 2017  8:33 AM     Your access code will  in 90 days. If you need help or a new code, please contact your [facility] or call [phone] for assistance.        Care EveryWhere ID     This is your Care EveryWhere ID. This could be used by other organizations to access your [place] medical records  [id number]        Your Vitals Were     Pulse Temperature Respirations Height Pulse Oximetry BMI (Body Mass Index)    85 98.4  F (36.9  C) (Oral) 18 1.702 m (5' 7\") 94% 34.27 kg/m2       Blood Pressure from Last 3 Encounters:   05/10/17 148/80   17 138/70   17 130/84    Weight from Last 3 Encounters:   05/10/17 99.2 kg (218 lb 12.8 oz)   17 96.6 kg (213 lb)   16 102.3 kg (225 lb 8 oz)              Today, you had the following     No orders found for display       Primary Care Provider Office Phone # Fax #    Hernandez Luna [phone]       [facility] [place] [address] [address] [place] [address]        Thank you!     Thank you for choosing [facility]  for your care. Our goal is always to provide you with excellent care. Hearing back from our patients is one way we can continue to improve our services. Please take a few minutes to complete the written survey that you may receive in the mail after your " visit with us. Thank you!             Your Updated Medication List - Protect others around you: Learn how to safely use, store and throw away your medicines at www.disposemymeds.org.      Notice  As of 5/10/2017 12:06 PM    You have not been prescribed any medications.

## 2017-05-10 NOTE — PROGRESS NOTES
"  HPI      Review of Systems   Constitutional: Positive for malaise/fatigue. Negative for chills, diaphoresis, fever and weight loss.        Patient reports slight fatigue, patient believes related to stress at work.   HENT: Negative.    Eyes: Negative.    Respiratory: Negative.    Cardiovascular: Negative.    Gastrointestinal: Negative.    Genitourinary: Positive for dysuria, frequency and urgency. Negative for flank pain and hematuria.        Patient reports getting up one to two times per night to urinate, denies burning or discomfort with urination, denies hematuria.  Patient reports urinary incontinence, reports changing pad three times daily, wearing pad at night, \"when I wake up in the morning the pad is soaked\".  Patient reports urinary urgency, incomplete emptying and weak stream.  Patient scheduled for cysto + dilation on 5/11/2017.    AUA Score: 23  Potency Score: 5   Musculoskeletal: Negative.    Skin: Negative.    Neurological: Negative.  Negative for weakness.   Endo/Heme/Allergies: Negative.    Psychiatric/Behavioral: Negative for depression, hallucinations, memory loss, substance abuse and suicidal ideas. The patient is nervous/anxious. The patient does not have insomnia.         Patient reports slight anxiety/nervousness over clinic visit and treatment plan, presents to clinic with his wife, Lory.             INITIAL PATIENT ASSESSMENT    Diagnosis: Prostate cancer    Prior radiation therapy: None    Prior chemotherapy: None    Prior hormonal therapy:No    Pain Eval:  Denies    Psychosocial  Living arrangements: Lives at home in [place] with wife, Lory.  Patient works as a  for a manufacturing company.  Fall Risk: independent   referral needs: Not needed    Advanced Directive: Yes - Location: patient has copy  Implantable Cardiac Device? No      Reproductive note: Not recorded for male patient.    PCP: Dr. Luna  Urology: Dr. Rust    "

## 2017-05-10 NOTE — PATIENT INSTRUCTIONS
What to expect at your Simulation visit:    You will meet with a Radiation Therapist and other team members who will be doing a planning session called a  simulation  with you. This process will determine your daily treatment.    ~ You will lie on a flat table and have a treatment planning CT scan.  It is important during the scan to hold very still and breathe normally.    ~ Your therapist may construct a body mold to help you hold still for your treatments.    ~ If you are having treatment to the head or neck area you will be fitted with a plastic mesh mask that fits very snugly over your face and neck.     ~ Your therapist will be taking some digital photos that will go in your treatment chart.      ~Your therapist will make marks on your skin and take measurements. Your therapist may ask you about making small tattoos (a permanent small dot) over these marks.  These marks are used to position you daily for your radiation therapy treatments. Please do not wash off any marks until all of your radiation therapy treatments are complete unless you are instructed to do so by your therapist.    ~ Once the simulation is completed it can take from 3 to 10 business days before you start radiation therapy treatments.    ~ You may meet with a nurse who will go over management of treatment side effects and self care during your treatments. The nurse will help to plan care with other departments and physicians if needed.      Additional information for simulation of the pelvis area   ~ It is important to have the same amount of urine in your bladder for each treatment.  In order to prepare for your simulation and for daily treatments, please:  Empty your bladder two hours prior to your scheduled appointment time.  Then drink about 2 to 4 cups of liquid to fill your bladder again.  It is important to drink the same amount of liquid each day.  Don t drink too much- you should be comfortable during your simulation and  treatment.  You should not empty your bladder again until the simulation or treatment appointment is complete.  ~ You may have some fluid injected into the tip of your penis for a few minutes during the CT scan to help your doctor see your internal organs.     ~ Please speak with your physician, nurse or therapist if you have any questions regarding the above information.  If you feel you may have difficulties with this preparation, please let us know.  Please contact [facility] RN with questions or concerns following today's appointment: [phone].    Thank you!

## 2017-05-11 ENCOUNTER — HOSPITAL ENCOUNTER (OUTPATIENT)
Facility: AMBULATORY SURGERY CENTER | Age: 57
Discharge: HOME | End: 2017-05-11
Attending: UROLOGY | Admitting: UROLOGY
Payer: COMMERCIAL

## 2017-05-11 ENCOUNTER — ANESTHESIA (OUTPATIENT)
Dept: SURGERY | Facility: AMBULATORY SURGERY CENTER | Age: 57
End: 2017-05-11

## 2017-05-11 VITALS
DIASTOLIC BLOOD PRESSURE: 60 MMHG | HEART RATE: 91 BPM | OXYGEN SATURATION: 96 % | SYSTOLIC BLOOD PRESSURE: 110 MMHG | TEMPERATURE: 98 F | RESPIRATION RATE: 16 BRPM

## 2017-05-11 PROCEDURE — 52281 CYSTOSCOPY AND TREATMENT: CPT | Performed by: UROLOGY

## 2017-05-11 PROCEDURE — 52000 CYSTOURETHROSCOPY: CPT

## 2017-05-11 PROCEDURE — G8907 PT DOC NO EVENTS ON DISCHARG: HCPCS

## 2017-05-11 PROCEDURE — G8916 PT W IV AB GIVEN ON TIME: HCPCS

## 2017-05-11 PROCEDURE — 52281 CYSTOSCOPY AND TREATMENT: CPT

## 2017-05-11 RX ORDER — MEPERIDINE HYDROCHLORIDE 25 MG/ML
12.5 INJECTION INTRAMUSCULAR; INTRAVENOUS; SUBCUTANEOUS
Status: DISCONTINUED | OUTPATIENT
Start: 2017-05-11 | End: 2017-05-12 | Stop reason: HOSPADM

## 2017-05-11 RX ORDER — LIDOCAINE 40 MG/G
CREAM TOPICAL
Status: DISCONTINUED | OUTPATIENT
Start: 2017-05-11 | End: 2017-05-12 | Stop reason: HOSPADM

## 2017-05-11 RX ORDER — FENTANYL CITRATE 50 UG/ML
25-50 INJECTION, SOLUTION INTRAMUSCULAR; INTRAVENOUS
Status: DISCONTINUED | OUTPATIENT
Start: 2017-05-11 | End: 2017-05-12 | Stop reason: HOSPADM

## 2017-05-11 RX ORDER — CEFAZOLIN SODIUM 2 G/100ML
2 INJECTION, SOLUTION INTRAVENOUS
Status: COMPLETED | OUTPATIENT
Start: 2017-05-11 | End: 2017-05-11

## 2017-05-11 RX ORDER — FENTANYL CITRATE 50 UG/ML
INJECTION, SOLUTION INTRAMUSCULAR; INTRAVENOUS PRN
Status: DISCONTINUED | OUTPATIENT
Start: 2017-05-11 | End: 2017-05-11

## 2017-05-11 RX ORDER — SODIUM CHLORIDE, SODIUM LACTATE, POTASSIUM CHLORIDE, CALCIUM CHLORIDE 600; 310; 30; 20 MG/100ML; MG/100ML; MG/100ML; MG/100ML
INJECTION, SOLUTION INTRAVENOUS CONTINUOUS
Status: DISCONTINUED | OUTPATIENT
Start: 2017-05-11 | End: 2017-05-12 | Stop reason: HOSPADM

## 2017-05-11 RX ORDER — ONDANSETRON 2 MG/ML
INJECTION INTRAMUSCULAR; INTRAVENOUS PRN
Status: DISCONTINUED | OUTPATIENT
Start: 2017-05-11 | End: 2017-05-11

## 2017-05-11 RX ORDER — PROPOFOL 10 MG/ML
INJECTION, EMULSION INTRAVENOUS PRN
Status: DISCONTINUED | OUTPATIENT
Start: 2017-05-11 | End: 2017-05-11

## 2017-05-11 RX ORDER — ACETAMINOPHEN 325 MG/1
975 TABLET ORAL ONCE
Status: COMPLETED | OUTPATIENT
Start: 2017-05-11 | End: 2017-05-11

## 2017-05-11 RX ORDER — LIDOCAINE HYDROCHLORIDE 20 MG/ML
INJECTION, SOLUTION INFILTRATION; PERINEURAL PRN
Status: DISCONTINUED | OUTPATIENT
Start: 2017-05-11 | End: 2017-05-11

## 2017-05-11 RX ORDER — NALOXONE HYDROCHLORIDE 0.4 MG/ML
.1-.4 INJECTION, SOLUTION INTRAMUSCULAR; INTRAVENOUS; SUBCUTANEOUS
Status: DISCONTINUED | OUTPATIENT
Start: 2017-05-11 | End: 2017-05-12 | Stop reason: HOSPADM

## 2017-05-11 RX ORDER — ONDANSETRON 2 MG/ML
4 INJECTION INTRAMUSCULAR; INTRAVENOUS EVERY 30 MIN PRN
Status: DISCONTINUED | OUTPATIENT
Start: 2017-05-11 | End: 2017-05-12 | Stop reason: HOSPADM

## 2017-05-11 RX ORDER — PROPOFOL 10 MG/ML
INJECTION, EMULSION INTRAVENOUS CONTINUOUS PRN
Status: DISCONTINUED | OUTPATIENT
Start: 2017-05-11 | End: 2017-05-11

## 2017-05-11 RX ORDER — ONDANSETRON 4 MG/1
4 TABLET, ORALLY DISINTEGRATING ORAL EVERY 30 MIN PRN
Status: DISCONTINUED | OUTPATIENT
Start: 2017-05-11 | End: 2017-05-12 | Stop reason: HOSPADM

## 2017-05-11 RX ADMIN — LIDOCAINE HYDROCHLORIDE 40 MG: 20 INJECTION, SOLUTION INFILTRATION; PERINEURAL at 13:56

## 2017-05-11 RX ADMIN — SODIUM CHLORIDE, SODIUM LACTATE, POTASSIUM CHLORIDE, CALCIUM CHLORIDE: 600; 310; 30; 20 INJECTION, SOLUTION INTRAVENOUS at 13:25

## 2017-05-11 RX ADMIN — ONDANSETRON 4 MG: 2 INJECTION INTRAMUSCULAR; INTRAVENOUS at 13:58

## 2017-05-11 RX ADMIN — ACETAMINOPHEN 975 MG: 325 TABLET ORAL at 13:25

## 2017-05-11 RX ADMIN — PROPOFOL 50 MG: 10 INJECTION, EMULSION INTRAVENOUS at 13:56

## 2017-05-11 RX ADMIN — CEFAZOLIN SODIUM 2 G: 2 INJECTION, SOLUTION INTRAVENOUS at 13:57

## 2017-05-11 RX ADMIN — PROPOFOL 150 MCG/KG/MIN: 10 INJECTION, EMULSION INTRAVENOUS at 13:56

## 2017-05-11 RX ADMIN — SODIUM CHLORIDE, SODIUM LACTATE, POTASSIUM CHLORIDE, CALCIUM CHLORIDE: 600; 310; 30; 20 INJECTION, SOLUTION INTRAVENOUS at 13:54

## 2017-05-11 RX ADMIN — FENTANYL CITRATE 25 MCG: 50 INJECTION, SOLUTION INTRAMUSCULAR; INTRAVENOUS at 14:06

## 2017-05-11 NOTE — ANESTHESIA PREPROCEDURE EVALUATION
Anesthesia Evaluation     . Pt has had prior anesthetic.     No history of anesthetic complications          ROS/MED HX    ENT/Pulmonary:       Neurologic: Comment: H/O Guillan [place]. No secuelae      Cardiovascular:         METS/Exercise Tolerance:     Hematologic:         Musculoskeletal:         GI/Hepatic:         Renal/Genitourinary:         Endo:         Psychiatric:  - neg psychiatric ROS       Infectious Disease:         Malignancy:   (+) Malignancy History of Prostate  Prostate CA Remission status post Surgery,         Other:                     Physical Exam  Normal systems: cardiovascular and pulmonary    Airway   Mallampati: II  TM distance: >3 FB  Neck ROM: full    Dental     Cardiovascular   Rhythm and rate: regular and normal      Pulmonary                     Anesthesia Plan      History & Physical Review      ASA Status:  2 .    NPO Status:  > 8 hours    Plan for General and LMA with Intravenous induction. Maintenance will be Balanced.           Postoperative Care  Postoperative pain management:  Multi-modal analgesia.      Consents  Anesthetic plan, risks, benefits and alternatives discussed with:  Patient.  Use of blood products discussed: No .   .            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     Prostate cancer (H) 05/07/2015       PAST SURGICAL HISTORY:   Past Surgical History:   Procedure Laterality Date     ARTHROTOMY SHOULDER, ROTATOR CUFF REPAIR, COMBINED Right 10/7/2016    Right Martine CASE [place]     BIOPSY PROSTATE TRANSRECTAL  05/07/2015     BIOPSY PROSTATE TRANSRECTAL  07/06/2016     PROSTATECTOMY RETROPUBIC RADICAL  12/19/2016     SIGMOIDOSCOPY FLEXIBLE  7/15/2013    Procedure: SIGMOIDOSCOPY FLEXIBLE;  Sigmoidoscopy Flexible;  Surgeon: Dusty Chang MD;  Location:  [facility]       FAMILY HISTORY:   Family History   Problem Relation Age of Onset     DIABETES Maternal Grandmother      Hypertension Mother      Prostate Cancer Maternal Grandfather      C.A.D. No family hx of      Coronary Artery Disease No family hx of      Breast Cancer No family hx of      Ovarian Cancer No family hx of      Mental Illness No family hx of      Asthma No family hx of      Obesity No family hx of      Unknown/Adopted No family hx of      Anesthesia Reaction No family hx of      CEREBROVASCULAR DISEASE No family hx of      Other Cancer No family hx of      Cancer - colorectal No family hx of      Hyperlipidemia No family hx of      Known Genetic Syndrome No family hx of      OSTEOPOROSIS No family hx of      Depression No family hx of      Anxiety Disorder No family hx of      MENTAL ILLNESS No family hx of      Substance Abuse No family hx of      Thyroid Disease No family hx of      Genetic Disorder No family hx of      Colon Cancer No family hx of          Past Anes Hx: No personal or family h/o anesthesia problems    Soc Hx:   Tobacco:   EtOH:    Allergies: No Known Allergies    Meds:     (Not in a hospital admission)    No current outpatient prescriptions on file.       Physical Exam:  VS: T Data Unavailable, P Data Unavailable, BP Data Unavailable, R Data Unavailable, SpO2           BMP:  Lab Results   Component Value Date     04/22/2015      Lab Results   Component Value Date    POTASSIUM 4.2 04/22/2015     Lab Results   Component Value Date    CHLORIDE 105 04/22/2015     Lab Results   Component Value Date    DOLORES 9.0 04/22/2015     Lab Results   Component Value Date    CO2 28 04/22/2015     Lab Results   Component Value Date    BUN 12 04/22/2015     Lab Results   Component Value Date    CR 1.01 04/22/2015     Lab Results   Component Value Date    GLC 96 04/22/2015        CBC:  Lab Results   Component Value Date    WBC 7.4 05/03/2017     Lab Results   Component Value Date    HGB 15.3  05/03/2017     Lab Results   Component Value Date    HCT 44.2 05/03/2017     Lab Results   Component Value Date     05/03/2017        Coags/Type and Screen  No results found for: INR  No results found for: PT  Type and Screen:      Yunior Shrestha MD    5/11/2017  1:13 PM  Risks, benefits, side effects and intended purposes discussed.                  .

## 2017-05-11 NOTE — DISCHARGE INSTRUCTIONS
[facility]  Same-Day Surgery   Adult Discharge Orders & Instructions   For 24 hours after surgery  1. Get plenty of rest.  A responsible adult must stay with you for at least 24 hours after you leave the hospital.   2. Do not drive or use heavy equipment.  If you have weakness or tingling, don't drive or use heavy equipment until this feeling goes away.  3. Do not drink alcohol.  4. Avoid strenuous or risky activities.  Ask for help when climbing stairs.   5. You may feel lightheaded.  IF so, sit for a few minutes before standing.  Have someone help you get up.   6. If you have nausea (feel sick to your stomach): Drink only clear liquids such as apple juice, ginger ale, broth or 7-Up.  Rest may also help.  Be sure to drink enough fluids.  Move to a regular diet as you feel able.  7. You may have a slight fever. Call the doctor if your fever is over 100 F (37.7 C) (taken under the tongue) or lasts longer than 24 hours.  8. You may have a dry mouth, a sore throat, muscle aches or trouble sleeping.  These should go away after 24 hours.  9. Do not make important or legal decisions.   Call your doctor for any of the followin.  Signs of infection (fever, growing tenderness at the surgery site, a large amount of drainage or bleeding, severe pain, foul-smelling drainage, redness, swelling).    2. It has been over 8 to 10 hours since surgery and you are still not able to urinate (pass water).    3.  Headache for over 24 hours.    4.  Numbness, tingling or weakness the day after surgery (if you had spinal anesthesia).  To contact a doctor, call  [phone]

## 2017-05-11 NOTE — IP AVS SNAPSHOT
[facility]    [address] [place] [address]    Phone:  [phone]                                       After Visit Summary   5/11/2017    Estuardo Bowden    MRN: [id number]           After Visit Summary Signature Page     I have received my discharge instructions, and my questions have been answered. I have discussed any challenges I see with this plan with the nurse or doctor.    ..........................................................................................................................................  Patient/Patient Representative Signature      ..........................................................................................................................................  Patient Representative Print Name and Relationship to Patient    ..................................................               ................................................  Date                                            Time    ..........................................................................................................................................  Reviewed by Signature/Title    ...................................................              ..............................................  Date                                                            Time

## 2017-05-11 NOTE — IP AVS SNAPSHOT
MRN:[id number]                      After Visit Summary   5/11/2017    Estuardo Bowden    MRN: [id number]           Thank you!     Thank you for choosing [facility] for your care. Our goal is always to provide you with excellent care. Hearing back from our patients is one way we can continue to improve our services. Please take a few minutes to complete the written survey that you may receive in the mail after you visit with us. Thank you!        Patient Information     Date Of Birth          1960        About your hospital stay     You were admitted on:  May 11, 2017 You last received care in the:  [facility]    You were discharged on:  May 11, 2017       Who to Call     For medical emergencies, please call 911.  For non-urgent questions about your medical care, please call your primary care provider or clinic, [phone]  For questions related to your surgery, please call your surgery clinic        Attending Provider     Provider Ross Ruiz MD Urology       Primary Care Provider Office Phone # Fax #    Hernandez Elvin Luna [phone]       [facility] [id number] [place] [address]        After Care Instructions     Diet Instructions       Resume pre procedure diet            Discharge Instructions       Resume Aspirin / warfarin (COUMADIN) when urine is clear to faint pink            No Alcohol       for 24 hours post procedure            No driving or operating machinery        until the day after procedure                  Your next 10 appointments already scheduled     May 11, 2017   Procedure with Ross Rust MD   [facility] (--)    [address]  [facility] [address] [phone]            May 17, 2017  4:45 PM CDT   New Treatment with RADIATION THERAPIST   [facility]    [address] [facility] [address]    [phone]            May 18, 2017  4:45 PM CDT   TREATMENT with RADIATION THERAPIST   [facility]    [address] [facility] [address]   [phone]            May 19, 2017  4:45 PM CDT   TREATMENT with RADIATION THERAPIST   [facility] ([facility])    [address] [facility] [address]   [phone]            May 22, 2017  4:45 PM CDT   TREATMENT with RADIATION THERAPIST   [facility] ([facility])    [address] [facility] [address]   [phone]            May 23, 2017  4:15 PM CDT   TREATMENT with RADIATION THERAPIST   [facility] ([facility])    [address] [facility] [address]   [phone]            May 24, 2017  4:15 PM CDT   TREATMENT with RADIATION THERAPIST   [facility] ([facility])    [address] [facility] [address]   [phone]            May 25, 2017 11:45 AM CDT   TREATMENT with RADIATION THERAPIST   [facility] ([facility])    [address] [facility] [address]   [phone]            May 26, 2017 11:15 AM CDT   TREATMENT with RADIATION THERAPIST   [facility] ([facility])    [address] [facility] [address]   [phone]            May 30, 2017 10:30 AM CDT   TREATMENT with RADIATION THERAPIST   [facility] ([facility])    [address] [facility] [address]   [phone]              Further instructions from your care team       [facility] [place]  Same-Day Surgery   Adult Discharge Orders & Instructions   For 24 hours after surgery  1. Get plenty of rest.  A responsible adult must stay with you for at least 24 hours after you leave the hospital.   2. Do not drive or use  heavy equipment.  If you have weakness or tingling, don't drive or use heavy equipment until this feeling goes away.  3. Do not drink alcohol.  4. Avoid strenuous or risky activities.  Ask for help when climbing stairs.   5. You may feel lightheaded.  IF so, sit for a few minutes before standing.  Have someone help you get up.   6. If you have nausea (feel sick to your stomach): Drink only clear liquids such as apple juice, ginger ale, broth or 7-Up.  Rest may also help.  Be sure to drink enough fluids.  Move to a regular diet as you feel able.  7. You may have a slight fever. Call the doctor if your fever is over 100 F (37.7 C) (taken under the tongue) or lasts longer than 24 hours.  8. You may have a dry mouth, a sore throat, muscle aches or trouble sleeping.  These should go away after 24 hours.  9. Do not make important or legal decisions.   Call your doctor for any of the followin.  Signs of infection (fever, growing tenderness at the surgery site, a large amount of drainage or bleeding, severe pain, foul-smelling drainage, redness, swelling).    2. It has been over 8 to 10 hours since surgery and you are still not able to urinate (pass water).    3.  Headache for over 24 hours.    4.  Numbness, tingling or weakness the day after surgery (if you had spinal anesthesia).  To contact a doctor, call  [phone]    Pending Results     No orders found from 2017 to 2017.            Admission Information     Date & Time Provider Department Dept. Phone    2017 Ross Rust MD [facility] [phone]      Your Vitals Were     Blood Pressure Pulse Temperature Respirations Pulse Oximetry       137/69 91 98  F (36.7  C) (Temporal) 16 95%       MyChart Information     [employer] gives you secure access to your electronic health record. If you see a primary care provider, you can also send messages to your care team and make appointments. If you have questions, please call your  primary care clinic.  If you do not have a primary care provider, please call [phone] and they will assist you.        Care EveryWhere ID     This is your Care EveryWhere ID. This could be used by other organizations to access your Mobile medical records  [id number]           Review of your medicines      Notice     You have not been prescribed any medications.             Protect others around you: Learn how to safely use, store and throw away your medicines at www.disposemymeds.org.             Medication List: This is a list of all your medications and when to take them. Check marks below indicate your daily home schedule. Keep this list as a reference.      Notice     You have not been prescribed any medications.

## 2017-05-11 NOTE — ANESTHESIA CARE TRANSFER NOTE
Patient: Estuardo Bowden    Procedure(s):  Cystoscopy, ballon dilation - Wound Class: II-Clean Contaminated    Diagnosis: urethral stricture  Diagnosis Additional Information: No value filed.    Anesthesia Type:   MAC     Note:    Patient transferred to:Phase II  Comments: To Phase II, awake, comfortable, sats 100%, Report to RN.      Vitals: (Last set prior to Anesthesia Care Transfer)    CRNA VITALS  5/11/2017 1344 - 5/11/2017 1417      5/11/2017             NIBP: (!)  113/98    Pulse: 90    NIBP Mean: 101    SpO2: 96 %                Electronically Signed By: ADORE Wesley CRNA  May 11, 2017  2:17 PM

## 2017-05-11 NOTE — ANESTHESIA POSTPROCEDURE EVALUATION
Patient: Estuardo Bowden    Procedure(s):  Cystoscopy, ballon dilation - Wound Class: II-Clean Contaminated    Diagnosis:urethral stricture  Diagnosis Additional Information: No value filed.    Anesthesia Type:  MAC    Note:  Anesthesia Post Evaluation    Patient location during evaluation: Phase 2  Patient participation: Able to fully participate in evaluation  Level of consciousness: awake  Pain management: adequate  Airway patency: patent  Cardiovascular status: acceptable and stable  Respiratory status: acceptable and room air  Hydration status: acceptable  PONV: none     Anesthetic complications: None          Last vitals:  Vitals:    05/11/17 1322 05/11/17 1417   BP: 148/84 137/69   Pulse:  91   Resp: 16 16   Temp: 98  F (36.7  C) 98  F (36.7  C)   SpO2: 95% 95%         Electronically Signed By: Cameron Shrestha MD  May 11, 2017  2:26 PM

## 2017-05-11 NOTE — BRIEF OP NOTE
[facility]  Brief Operative Note    Pre-operative diagnosis: Bladder neck contracture   Post-operative diagnosis same   Procedure: Procedure(s):  COMBINED CYSTOSCOPY, DILATE URETHRA   Surgeon: Ross Rust MD   Assistants(s): None   Anesthesia: Local with MAC    Estimated blood loss: minimal                   Specimens: None   Implants: None       Complications: None   Condition on discharge: Stable   Comments: See dictated operative report for full details

## 2017-05-12 ENCOUNTER — APPOINTMENT (OUTPATIENT)
Dept: RADIATION ONCOLOGY | Facility: CLINIC | Age: 57
End: 2017-05-12
Payer: COMMERCIAL

## 2017-05-12 PROCEDURE — 77338 DESIGN MLC DEVICE FOR IMRT: CPT | Performed by: RADIOLOGY

## 2017-05-12 PROCEDURE — 77301 RADIOTHERAPY DOSE PLAN IMRT: CPT | Performed by: RADIOLOGY

## 2017-05-13 NOTE — OP NOTE
DATE OF PROCEDURE:  2017      PREOPERATIVE DIAGNOSIS:  Bladder neck contracture.      POSTOPERATIVE DIAGNOSIS:  Bladder neck contracture.      PROCEDURE:  Cystoscopy and balloon dilation of bladder neck contracture.      DESCRIPTION OF PROCEDURE:  Jacob Martinez was brought to the operating room after sedation was induced.  He was placed supine.  He was draped and prepped in the usual surgical fashion.  Preop antibiotic was given.  A flexible cystoscope was then placed into the bladder.  A 0.035 Bentson wire was introduced into the bladder neck contracture into the bladder.  After this is done, a 30-[place] UroMax balloon was then placed into the contracture area and dilated under fluoroscopy.  This was done without any difficulty.  After that was done, cystoscopy was then performed again that showed good opening of the bladder neck with minimal or hardly any bleeding at all.  The patient tolerated the procedure well.  There were no complications identified during the procedure.  There was minimal bleeding during the operation.         GENTRY ACEVEDO MD             D: 2017 14:16   T: 2017 19:22   MT: [id number]      Name:     JACOB MARTINEZ   MRN:      [id number]        Account:        [id number]   :      1960           Procedure Date: 2017      Document: [id number]

## 2017-05-15 ENCOUNTER — APPOINTMENT (OUTPATIENT)
Dept: RADIATION ONCOLOGY | Facility: CLINIC | Age: 57
End: 2017-05-15
Payer: COMMERCIAL

## 2017-05-15 PROCEDURE — 77300 RADIATION THERAPY DOSE PLAN: CPT | Performed by: RADIOLOGY

## 2017-05-16 ENCOUNTER — CARE COORDINATION (OUTPATIENT)
Dept: RADIATION ONCOLOGY | Facility: CLINIC | Age: 57
End: 2017-05-16

## 2017-05-16 NOTE — PROGRESS NOTES
Received notification from Dr. Navarrete following peer review with Dr. Mar at [facility], recommendation for hormone therapy with Lupron.  This RN reviewed with Moise Serrano, Resident with Dr. Mar and if patient agreeable to hormone therapy, delay start of radiation for six to eight weeks following first injection.  This RN contacted patient to further discuss, reviewed recommendation, reviewed Lupron injection administration and side effects.  Informed patient that this RN also notified Dr. Rust of plan, Dr. Rust will order Lupron injection and staff will contact patient to schedule appointment.  Informed patient that once Lupron injection scheduled, radiation therapy department at [facility] will be in contact to schedule future radiation appointments, approximately 7/17/2017.  Patient verbalized understanding of all information, agreeable with plan and had no questions at this time.    Radiation therapists notified of cancellation of radiation treatments as scheduled at this time, will delay six to eight weeks following Lupron injection.    Carmel Sprague, RN BSN OCN

## 2017-05-18 ENCOUNTER — OFFICE VISIT (OUTPATIENT)
Dept: UROLOGY | Facility: OTHER | Age: 57
End: 2017-05-18
Payer: COMMERCIAL

## 2017-05-18 VITALS — HEART RATE: 72 BPM | DIASTOLIC BLOOD PRESSURE: 88 MMHG | RESPIRATION RATE: 16 BRPM | SYSTOLIC BLOOD PRESSURE: 120 MMHG

## 2017-05-18 DIAGNOSIS — R39.198 SLOWING OF URINARY STREAM: ICD-10-CM

## 2017-05-18 DIAGNOSIS — C61 PROSTATE CANCER (H): Primary | ICD-10-CM

## 2017-05-18 PROCEDURE — 96402 CHEMO HORMON ANTINEOPL SQ/IM: CPT | Performed by: UROLOGY

## 2017-05-18 NOTE — PROGRESS NOTES
Chief Complaint   Patient presents with     WINDY Marte Norberto Bowden is a 57 year old male who presents today for follow up of   Chief Complaint   Patient presents with     RECHECK     eligard   f/u for prostate cancer s/p bnc dilation recently. He is voiding well without any complaints.  He has see radiation oncology and is set up for salvage XRT.    Current Outpatient Prescriptions   Medication Sig Dispense Refill     leuprolide (ELIGARD) 45 MG kit Inject 45 mg Subcutaneous every 6 months       No Known Allergies   Past Medical History:   Diagnosis Date     ED (erectile dysfunction)      Guillain-[place] syndrome (H) 1999    no sequale     Prostate cancer (H) 05/07/2015     Past Surgical History:   Procedure Laterality Date     ARTHROTOMY SHOULDER, ROTATOR CUFF REPAIR, COMBINED Right 10/7/2016    Right RCR, Russell Farley     BIOPSY PROSTATE TRANSRECTAL  05/07/2015     BIOPSY PROSTATE TRANSRECTAL  07/06/2016     CYSTOSCOPY, DILATE URETHRA, COMBINED N/A 5/11/2017    Procedure: COMBINED CYSTOSCOPY, DILATE URETHRA;  Cystoscopy, ballon dilation;  Surgeon: Ross Rust MD;  Location: [facility] OR     PROSTATECTOMY RETROPUBIC RADICAL  12/19/2016     SIGMOIDOSCOPY FLEXIBLE  7/15/2013    Procedure: SIGMOIDOSCOPY FLEXIBLE;  Sigmoidoscopy Flexible;  Surgeon: Dusty Chang MD;  Location: [facility]     Family History   Problem Relation Age of Onset     DIABETES Maternal Grandmother      Hypertension Mother      Prostate Cancer Maternal Grandfather      C.A.D. No family hx of      Coronary Artery Disease No family hx of      Breast Cancer No family hx of      Ovarian Cancer No family hx of      Mental Illness No family hx of      Asthma No family hx of      Obesity No family hx of      Unknown/Adopted No family hx of      Anesthesia Reaction No family hx of      CEREBROVASCULAR DISEASE No family hx of      Other Cancer No family hx of      Cancer - colorectal No family hx of      Hyperlipidemia No  family hx of      Known Genetic Syndrome No family hx of      OSTEOPOROSIS No family hx of      Depression No family hx of      Anxiety Disorder No family hx of      MENTAL ILLNESS No family hx of      Substance Abuse No family hx of      Thyroid Disease No family hx of      Genetic Disorder No family hx of      Colon Cancer No family hx of      Social History     Social History     Marital status:      Spouse name: N/A     Number of children: 2     Years of education: N/A     Occupational History      [employer] [place]     Social History Main Topics     Smoking status: Never Smoker     Smokeless tobacco: Current User     Types: Chew      Comment: 1 tin every 3 days     Alcohol use 7.2 oz/week     12 Cans of beer, 0 Standard drinks or equivalent per week      Comment: 12 beers per week     Drug use: No     Sexual activity: Yes     Partners: Female     Birth control/ protection: None     Other Topics Concern     Parent/Sibling W/ Cabg, Mi Or Angioplasty Before 65f 55m? No      Service No     Blood Transfusions No     Caffeine Concern No     Occupational Exposure No     Hobby Hazards No     Sleep Concern No     Stress Concern No     Weight Concern No     Special Diet No     Back Care No     Exercise Yes     [employer] 12+ times per month     Seat Belt Yes     Self-Exams Yes     Social History Narrative       REVIEW OF SYSTEMS  =================  C: NEGATIVE for fever, chills, change in weight  I: NEGATIVE for worrisome rashes, moles or lesions  E/M: NEGATIVE for ear, mouth and throat problems  R: NEGATIVE for significant cough or SHORTNESS OF BREATH,   CV: NEGATIVE for chest pain, palpitations or peripheral edema  GI: NEGATIVE for nausea, abdominal pain, heartburn, or change in bowel habits  NEURO: NEGATIVE any motor/sensory changes  PSYCH: NEGATIVE for recent mood disorder    Physical Exam:  /88 (BP Location: Left arm, Patient Position: Chair, Cuff Size: Adult Large)  Pulse 72  Resp 16    Patient is pleasant, in no acute distress, good general condition.  Lung: no evidence of respiratory distress    Abdomen: Soft, nondistended, non tender. No masses. No rebound or guarding.   Exam: no cva tenderness  Skin: Warm and dry.  No redness.  Psych: normal mood and affect  Neuro: alert and oriented    Assessment/Plan:   (C61) Prostate cancer (H)  (primary encounter diagnosis)  Comment: eligard given today  Plan: side effects discussed          Increase vit D and calcium intake         See me after finishing XRT    (R31.198) Slowing of urinary stream  Comment: s/p dilation  Plan: prn.

## 2017-05-18 NOTE — MR AVS SNAPSHOT
After Visit Summary   5/18/2017    Estuardo Bowden    MRN: [id number]           Patient Information     Date Of Birth          1960        Visit Information        Provider Department      5/18/2017 8:00 AM Ross Rust MD [facility]        Today's Diagnoses     Prostate cancer (H)    -  1    Slowing of urinary stream           Follow-ups after your visit        Who to contact     If you have questions or need follow up information about today's clinic visit or your schedule please contact [facility] directly at [phone].  Normal or non-critical lab and imaging results will be communicated to you by [employer], letter or phone within 4 business days after the clinic has received the results. If you do not hear from us within 7 days, please contact the clinic through [employer] or phone. If you have a critical or abnormal lab result, we will notify you by phone as soon as possible.  Submit refill requests through [employer] or call your pharmacy and they will forward the refill request to us. Please allow 3 business days for your refill to be completed.          Additional Information About Your Visit        MyChart Information     [employer] gives you secure access to your electronic health record. If you see a primary care provider, you can also send messages to your care team and make appointments. If you have questions, please call your primary care clinic.  If you do not have a primary care provider, please call [phone] and they will assist you.        Care EveryWhere ID     This is your Care EveryWhere ID. This could be used by other organizations to access your [facility] medical records  [id number]        Your Vitals Were     Pulse Respirations                72 16           Blood Pressure from Last 3 Encounters:   05/18/17 120/88   05/11/17 110/60   05/10/17 148/80    Weight from Last 3 Encounters:   05/10/17 99.2 kg (218 lb 12.8 oz)   05/03/17 96.6  kg (213 lb)   12/23/16 102.3 kg (225 lb 8 oz)              We Performed the Following     INJECTION INTRAMUSCULAR OR SUB-Q     LEUPROLIDE ACETATE 7.5MG        Primary Care Provider Office Phone # Fax #    Hernandez Luna [phone]       [facility] [place] [address]  [place] [address]        Thank you!     Thank you for choosing [facility]  for your care. Our goal is always to provide you with excellent care. Hearing back from our patients is one way we can continue to improve our services. Please take a few minutes to complete the written survey that you may receive in the mail after your visit with us. Thank you!             Your Updated Medication List - Protect others around you: Learn how to safely use, store and throw away your medicines at www.disposemymeds.org.          This list is accurate as of: 5/18/17  8:11 AM.  Always use your most recent med list.                   Brand Name Dispense Instructions for use    leuprolide 45 MG kit    ELIGARD     Inject 45 mg Subcutaneous every 6 months

## 2017-05-18 NOTE — NURSING NOTE
"Chief Complaint   Patient presents with     RECHECK     nathalyjacob       Initial /88 (BP Location: Left arm, Patient Position: Chair, Cuff Size: Adult Large)  Pulse 72  Resp 16 Estimated body mass index is 34.27 kg/(m^2) as calculated from the following:    Height as of 5/10/17: 1.702 m (5' 7\").    Weight as of 5/10/17: 99.2 kg (218 lb 12.8 oz).  Medication Reconciliation: complete   Brittany Pope, KENNETH      "

## 2017-05-19 ENCOUNTER — TELEPHONE (OUTPATIENT)
Dept: RADIATION ONCOLOGY | Facility: CLINIC | Age: 57
End: 2017-05-19

## 2017-05-22 ENCOUNTER — TELEPHONE (OUTPATIENT)
Dept: RADIATION ONCOLOGY | Facility: CLINIC | Age: 57
End: 2017-05-22

## 2017-05-22 NOTE — TELEPHONE ENCOUNTER
Patient returned call from Dr. Lam.  He is receiving Eligard from his urologist, Dr. Rust.  Dr. Lam wanted to confirm with patient that he indeed had received androgen deprivation therapy.

## 2017-07-12 ENCOUNTER — APPOINTMENT (OUTPATIENT)
Dept: RADIATION ONCOLOGY | Facility: CLINIC | Age: 57
End: 2017-07-12
Payer: COMMERCIAL

## 2017-07-12 PROCEDURE — 77385 ZZC IMRT TREATMENT DELIVERY, SIMPLE: CPT | Performed by: RADIOLOGY

## 2017-07-12 PROCEDURE — 77014 ZZHC CT GUIDE FOR PLACEMENT RADIATION THERAPY FIELDS: CPT | Performed by: RADIOLOGY

## 2017-07-13 ENCOUNTER — OFFICE VISIT (OUTPATIENT)
Dept: RADIATION ONCOLOGY | Facility: CLINIC | Age: 57
End: 2017-07-13
Payer: COMMERCIAL

## 2017-07-13 ENCOUNTER — APPOINTMENT (OUTPATIENT)
Dept: RADIATION ONCOLOGY | Facility: CLINIC | Age: 57
End: 2017-07-13
Payer: COMMERCIAL

## 2017-07-13 VITALS — BODY MASS INDEX: 33.99 KG/M2 | WEIGHT: 217 LBS

## 2017-07-13 DIAGNOSIS — C61 MALIGNANT NEOPLASM OF PROSTATE (H): Primary | ICD-10-CM

## 2017-07-13 PROCEDURE — 77385 ZZC IMRT TREATMENT DELIVERY, SIMPLE: CPT | Performed by: RADIOLOGY

## 2017-07-13 PROCEDURE — 99207 ZZC NO BILLABLE SERVICE THIS VISIT: CPT | Performed by: RADIOLOGY

## 2017-07-13 PROCEDURE — 77014 ZZHC CT GUIDE FOR PLACEMENT RADIATION THERAPY FIELDS: CPT | Performed by: RADIOLOGY

## 2017-07-13 ASSESSMENT — PAIN SCALES - GENERAL: PAINLEVEL: NO PAIN (0)

## 2017-07-13 NOTE — PATIENT INSTRUCTIONS
Please contact [facility] RN with questions or concerns following today's appointment: [phone].    Thank you!

## 2017-07-13 NOTE — MR AVS SNAPSHOT
After Visit Summary   7/13/2017    Estuardo Bowden    MRN: [id number]           Patient Information     Date Of Birth          1960        Visit Information        Provider Department      7/13/2017 1:15 PM Gina Navarrete MD [facility]        Today's Diagnoses     Malignant neoplasm of prostate (H)    -  1      Care Instructions    Please contact [facility] RN with questions or concerns following today's appointment: [phone].    Thank you!            Follow-ups after your visit        Your next 10 appointments already scheduled     Jul 14, 2017 12:45 PM CDT   TREATMENT with RADIATION THERAPIST   [facility] ([facility])    [address] [facility] [address]   [phone]            Jul 17, 2017 12:45 PM CDT   TREATMENT with RADIATION THERAPIST   [facility] ([facility])    [address] [facility] [address]   [phone]            Jul 18, 2017 12:45 PM CDT   TREATMENT with RADIATION THERAPIST   [facility] ([facility])    [address] [facility] [address]   [phone]            Jul 19, 2017 12:45 PM CDT   TREATMENT with RADIATION THERAPIST   [facility] ([facility])    [address] [facility] [address]   [phone]            Jul 20, 2017 12:45 PM CDT   TREATMENT with RADIATION THERAPIST   [facility] ([facility])    [address] [facility] [address]   [phone]            Jul 20, 2017  1:30 PM CDT   on treatment visit with Gina Navarrete MD   [facility] ([facility])    [address] [facility] [address]   [phone]            Jul 21, 2017 12:45 PM CDT   TREATMENT with RADIATION THERAPIST   [facility] ([facility]  [facility])    [address] [facility] [address]   [phone]            Jul 24, 2017 12:45 PM CDT   TREATMENT with RADIATION THERAPIST   [facility] ([facility]    [address] [facility] [address]   [phone]            Jul 25, 2017 12:45 PM CDT   TREATMENT with RADIATION THERAPIST   [facility] ([facility])    [address] [facility] [address]   [phone]            Jul 26, 2017 12:45 PM CDT   TREATMENT with RADIATION THERAPIST   [facility] ([facility])    [address] [facility] [address]   [phone]              Who to contact     If you have questions or need follow up information about today's clinic visit or your schedule please contact [facility] directly at [phone].  Normal or non-critical lab and imaging results will be communicated to you by RentJuicehart, letter or phone within 4 business days after the clinic has received the results. If you do not hear from us within 7 days, please contact the clinic through Keen Guidest or phone. If you have a critical or abnormal lab result, we will notify you by phone as soon as possible.  Submit refill requests through [facility] or call your pharmacy and they will forward the refill request to us. Please allow 3 business days for your refill to be completed.          Additional Information About Your Visit        Warm [facility] Information     [facility] gives you secure access to your electronic health record. If you see a primary care provider, you can also send messages to your care team and make appointments. If you have questions, please call your primary care clinic.  If you do not have a primary care provider, please call [phone] and they will assist you.      [facility] [employer] is an electronic gateway that provides easy, online access to your medical records. With [facility], you can request a  clinic appointment, read your test results, renew a prescription or communicate with your care team.     To access your existing account, please contact your [facility] or call [phone] for assistance.        Care EveryWhere ID     This is your Care EveryWhere ID. This could be used by other organizations to access your [facility] medical records  [id number]        Your Vitals Were     BMI (Body Mass Index)                   33.99 kg/m2            Blood Pressure from Last 3 Encounters:   05/18/17 120/88   05/11/17 110/60   05/10/17 148/80    Weight from Last 3 Encounters:   07/13/17 98.4 kg (217 lb)   05/10/17 99.2 kg (218 lb 12.8 oz)   05/03/17 96.6 kg (213 lb)              Today, you had the following     No orders found for display       Primary Care Provider Office Phone # Fax #    Hernandez Luna [phone]       [facility] [id number] [place] [id number]        Equal Access to Services     PARTHA VALADEZ : Hadii aad ku hadasho Soomaali, waaxda luqadaha, qaybta kaalmada adeegyada, waxay idiin hayaan aureliano davis . So [facility] [phone].    ATENCIÓN: Si habla español, tiene a sage disposición servicios gratuitos de asistencia lingüística. Llame al [phone].    We comply with applicable federal civil rights laws and [place] laws. We do not discriminate on the basis of race, color, national origin, age, disability sex, sexual orientation or gender identity.            Thank you!     Thank you for choosing [facility]  for your care. Our goal is always to provide you with excellent care. Hearing back from our patients is one way we can continue to improve our services. Please take a few minutes to complete the written survey that you may receive in the mail after your visit with us. Thank you!             Your Updated Medication List - Protect others around you: Learn how to safely use, store and throw away your  medicines at www.disposemymeds.org.          This list is accurate as of: 7/13/17  1:32 PM.  Always use your most recent med list.                   Brand Name Dispense Instructions for use Diagnosis    leuprolide 45 MG kit    ELIGARD     Inject 45 mg Subcutaneous every 6 months

## 2017-07-13 NOTE — PROGRESS NOTES
Teaching Flowsheet   Relevant Diagnosis: prostate cancer  Teaching Topic: radiation therapy     Person(s) involved in teaching:   Patient     Motivation Level:  Asks Questions: Yes  Eager to Learn: Yes  Cooperative: Yes  Receptive (willing/able to accept information): Yes  Any cultural factors/Zoroastrianism beliefs that may influence understanding or compliance? No       Patient demonstrates understanding of the following:  Reason for the appointment, diagnosis and treatment plan: Yes  Knowledge of proper use of medications and conditions for which they are ordered (with special attention to potential side effects or drug interactions): Yes  Which situations necessitate calling provider and whom to contact: Yes       Teaching Concerns Addressed:   Comments: Radiation therapy side effects: fatigue, skin changes and skin cares, hair loss, nausea/vomiting, diarrhea, urinary and bladder changes     Proper use and care of  (medical equip, care aids, etc.): NA  Nutritional needs and diet plan: Yes  Pain management techniques: Yes  Wound Care: Yes  How and/when to access community resources: Yes     Instructional Materials Used/Given: Radiation Therapy and You booklet     Time spent with patient: 15 minutes.

## 2017-07-13 NOTE — PROGRESS NOTES
[facility] PHYSICIANS  SPECIALIZING IN BREAKTHROUGHS  Radiation Oncology    On Treatment Visit Note      Estuardo Bowden      Date: 2017   MRN: [id number]   : 1960  Diagnosis: prostate cancer      Reason for Visit:  On Radiation Treatment Visit     Treatment Summary to Date  Treatment Site: prostate_bed Current Dose: 400/7000 cGy Fractions:       Chemotherapy  Chemo concurrent with radx?: No    Subjective:   Just started.  Continues normal working schedule.     Nursing ROS:   Nutrition Alteration  Diet Type: Patient's Preference  Skin  Skin Reaction: 0 - No changes  Skin Intervention: skin changes, skin cares and hair loss reviewed           Gastrointestinal  Nausea: 0 - None  Diarrhea: 0 - None  Genitourinary  Urinary Status: 0 - Normal   Note: patient reports getting up one time per night to urinate  Psychosocial  Mood - Anxiety: 0 - Normal  Mood - Depression: 0 - Normal  Pyschosocial Note: energy level at baseline  Pain Assessment  0-10 Pain Scale: 0      Objective:   Wt 98.4 kg (217 lb)  BMI 33.99 kg/m2   No abnormalities on exam.    Labs:  CBC RESULTS:   Recent Labs   Lab Test  17   0959   WBC  7.4   RBC  4.74   HGB  15.3   HCT  44.2   MCV  93   MCH  32.3   MCHC  34.6   RDW  15.3*   PLT  243     ELECTROLYTES:  Recent Labs   Lab Test  04/22/15   0829   NA  138   POTASSIUM  4.2   CHLORIDE  105   DOLORES  9.0   CO2  28   BUN  12   CR  1.01   GLC  96       Assessment:    Tolerating radiation therapy well.  All questions and concerns addressed.    Plan:   1. Continue current therapy.        Mosaiq chart and setup information reviewed    Medication Review  Med list reviewed with patient?: Yes  Med list printed and given: Offered and declined    Educational Topic Discussed  Education Instructions: Radiation therapy side effects: fatigue, skin changes and skin cares, hair loss, nausea/vomiting, diarrhea, urinary and bladder changes      Gina Navarrete MD

## 2017-07-14 ENCOUNTER — APPOINTMENT (OUTPATIENT)
Dept: RADIATION ONCOLOGY | Facility: CLINIC | Age: 57
End: 2017-07-14
Payer: COMMERCIAL

## 2017-07-14 PROCEDURE — 77385 ZZC IMRT TREATMENT DELIVERY, SIMPLE: CPT | Performed by: RADIOLOGY

## 2017-07-14 PROCEDURE — 77014 ZZHC CT GUIDE FOR PLACEMENT RADIATION THERAPY FIELDS: CPT | Performed by: RADIOLOGY

## 2017-07-17 ENCOUNTER — APPOINTMENT (OUTPATIENT)
Dept: RADIATION ONCOLOGY | Facility: CLINIC | Age: 57
End: 2017-07-17
Payer: COMMERCIAL

## 2017-07-17 PROCEDURE — 77014 ZZHC CT GUIDE FOR PLACEMENT RADIATION THERAPY FIELDS: CPT | Performed by: RADIOLOGY

## 2017-07-17 PROCEDURE — 77385 ZZC IMRT TREATMENT DELIVERY, SIMPLE: CPT | Performed by: RADIOLOGY

## 2017-07-18 ENCOUNTER — APPOINTMENT (OUTPATIENT)
Dept: RADIATION ONCOLOGY | Facility: CLINIC | Age: 57
End: 2017-07-18
Payer: COMMERCIAL

## 2017-07-18 PROCEDURE — 77427 RADIATION TX MANAGEMENT X5: CPT | Performed by: RADIOLOGY

## 2017-07-18 PROCEDURE — 77385 ZZC IMRT TREATMENT DELIVERY, SIMPLE: CPT | Performed by: RADIOLOGY

## 2017-07-18 PROCEDURE — 77336 RADIATION PHYSICS CONSULT: CPT | Performed by: RADIOLOGY

## 2017-07-18 PROCEDURE — 77014 ZZHC CT GUIDE FOR PLACEMENT RADIATION THERAPY FIELDS: CPT | Performed by: RADIOLOGY

## 2017-07-19 ENCOUNTER — APPOINTMENT (OUTPATIENT)
Dept: RADIATION ONCOLOGY | Facility: CLINIC | Age: 57
End: 2017-07-19
Payer: COMMERCIAL

## 2017-07-19 PROCEDURE — 77385 ZZC IMRT TREATMENT DELIVERY, SIMPLE: CPT | Performed by: RADIOLOGY

## 2017-07-19 PROCEDURE — 77014 ZZHC CT GUIDE FOR PLACEMENT RADIATION THERAPY FIELDS: CPT | Performed by: RADIOLOGY

## 2017-07-20 ENCOUNTER — OFFICE VISIT (OUTPATIENT)
Dept: RADIATION ONCOLOGY | Facility: CLINIC | Age: 57
End: 2017-07-20
Payer: COMMERCIAL

## 2017-07-20 ENCOUNTER — APPOINTMENT (OUTPATIENT)
Dept: RADIATION ONCOLOGY | Facility: CLINIC | Age: 57
End: 2017-07-20
Payer: COMMERCIAL

## 2017-07-20 VITALS — BODY MASS INDEX: 34.3 KG/M2 | WEIGHT: 219 LBS

## 2017-07-20 DIAGNOSIS — C61 MALIGNANT NEOPLASM OF PROSTATE (H): Primary | ICD-10-CM

## 2017-07-20 PROCEDURE — 77014 ZZHC CT GUIDE FOR PLACEMENT RADIATION THERAPY FIELDS: CPT | Performed by: RADIOLOGY

## 2017-07-20 PROCEDURE — 99207 ZZC NO BILLABLE SERVICE THIS VISIT: CPT | Performed by: RADIOLOGY

## 2017-07-20 PROCEDURE — 77385 ZZC IMRT TREATMENT DELIVERY, SIMPLE: CPT | Performed by: RADIOLOGY

## 2017-07-20 ASSESSMENT — PAIN SCALES - GENERAL: PAINLEVEL: NO PAIN (0)

## 2017-07-20 NOTE — PROGRESS NOTES
[facility] PHYSICIANS  SPECIALIZING IN BREAKTHROUGHS  Radiation Oncology    On Treatment Visit Note      Estuardo Bowden      Date: 2017   MRN: [id number]   : 1960  Diagnosis: prostate cancer      Reason for Visit:  On Radiation Treatment Visit     Treatment Summary to Date  Treatment Site: prostate_bed Current Dose: 1400/7000 cGy Fractions:       Chemotherapy  Chemo concurrent with radx?: Yes  Drug Name/Frequency 1: jose    Subjective:   Asked about exercise. Also asked about 'how to know' results.  Having trouble regulating bladder volume for tx.     Nursing ROS:   Nutrition Alteration  Diet Type: Patient's Preference              Gastrointestinal  Nausea: 0 - None  Diarrhea: 0 - None  Genitourinary  Urinary Status: 0 - Normal   Note: patient reports getting up one time per night to urinate  Psychosocial  Mood - Anxiety: 0 - Normal  Mood - Depression: 0 - Normal  Pyschosocial Note: energy level at baseline  Pain Assessment  0-10 Pain Scale: 0      Objective:   Wt 99.3 kg (219 lb)  BMI 34.3 kg/m2   No change.      Labs:  CBC RESULTS:   Recent Labs   Lab Test  17   0959   WBC  7.4   RBC  4.74   HGB  15.3   HCT  44.2   MCV  93   MCH  32.3   MCHC  34.6   RDW  15.3*   PLT  243     ELECTROLYTES:  Recent Labs   Lab Test  04/22/15   0829   NA  138   POTASSIUM  4.2   CHLORIDE  105   DOLORES  9.0   CO2  28   BUN  12   CR  1.01   GLC  96       Assessment:    Tolerating radiation therapy well.  All questions and concerns addressed.    Plan:   1. Continue current therapy.    2. Discussed bladder filling.  He's pretty much doing everything right - just having to find the fine line between full enough and miserable.    3. Discussed that PSA (because he's on hormonal tx) won't be helpful.  Discussed that he walk out of last tx as 'cancer survivor' and live his life.  Push the worry on us and if there's anything to worry about, we'll tell him  4. Yes to exercise.        Mosaiq chart and  setup information reviewed    Medication Review  Med list reviewed with patient?: Yes           Gina Navarrete MD

## 2017-07-20 NOTE — MR AVS SNAPSHOT
After Visit Summary   7/20/2017    Estuardo Bowden    MRN: [id number]           Patient Information     Date Of Birth          1960        Visit Information        Provider Department      7/20/2017 1:15 PM Gina Navarrete MD [facility]        Today's Diagnoses     Malignant neoplasm of prostate (H)    -  1      Care Instructions    Please contact [facility] RN with questions or concerns following today's appointment: [phone].    Thank you!            Follow-ups after your visit        Your next 10 appointments already scheduled     Jul 21, 2017 12:45 PM CDT   TREATMENT with RADIATION THERAPIST   [facility] ([facility])    [address] [facility] [address]   [phone]            Jul 24, 2017 12:45 PM CDT   TREATMENT with RADIATION THERAPIST   [facility] ([facility])    [address] [facility] [address]   [phone]            Jul 25, 2017 12:45 PM CDT   TREATMENT with RADIATION THERAPIST   [facility] ([facility])    [address] [facility] [address]   [phone]            Jul 26, 2017 12:45 PM CDT   TREATMENT with RADIATION THERAPIST   [facility] ([facility])    [address] [facility] [address]   [phone]            Jul 27, 2017 12:45 PM CDT   TREATMENT with RADIATION THERAPIST   [facility] ([facility])    [address] [facility] [address]   [phone]            Jul 27, 2017  1:30 PM CDT   on treatment visit with Gina Navarrete MD   [facility] ([facility])    [address] [facility] [address]   [phone]            Jul 28, 2017 12:45 PM CDT   TREATMENT with RADIATION THERAPIST   [facility] ([facility])    [address] [facility] [address]   [phone]            Jul 31, 2017 12:45 PM CDT   TREATMENT with RADIATION THERAPIST   [facility] ([facility])    [address] [facility] [address]   [phone]            Aug 01, 2017 12:45 PM CDT   TREATMENT with RADIATION THERAPIST   [facility] ([facility])    [address] [facility] [address]   [phone]            Aug 02, 2017 12:45 PM CDT   TREATMENT with RADIATION THERAPIST   [facility] ([facility])    [address] [facility] [address]   [phone]              Who to contact     If you have questions or need follow up information about today's clinic visit or your schedule please contact [facility] directly at [phone].  Normal or non-critical lab and imaging results will be communicated to you by Three Ringhart, letter or phone within 4 business days after the clinic has received the results. If you do not hear from us within 7 days, please contact the clinic through [email or web address] or phone. If you have a critical or abnormal lab result, we will notify you by phone as soon as possible.  Submit refill requests through TapTrak or call your pharmacy and they will forward the refill request to us. Please allow 3 business days for your refill to be completed.          Additional Information About Your Visit        TapTrak Information     TapTrak gives you secure access to your electronic health record. If you see a primary care provider, you can also send messages to your care team and make appointments. If you have questions, please call your primary care clinic.  If you do not have a primary care provider, please call [phone] and they will assist you.      TapTrak is an electronic gateway that provides easy, online access to your medical records. With TapTrak, you can request a  clinic appointment, read your test results, renew a prescription or communicate with your care team.     To access your existing account, please contact your [facility] or call [phone] for assistance.        Care EveryWhere ID     This is your Care EveryWhere ID. This could be used by other organizations to access your [facility] medical records  [id number]        Your Vitals Were     BMI (Body Mass Index)                   34.3 kg/m2            Blood Pressure from Last 3 Encounters:   05/18/17 120/88   05/11/17 110/60   05/10/17 148/80    Weight from Last 3 Encounters:   07/20/17 99.3 kg (219 lb)   07/13/17 98.4 kg (217 lb)   05/10/17 99.2 kg (218 lb 12.8 oz)              Today, you had the following     No orders found for display       Primary Care Provider Office Phone # Fax #    Hernandez Luna [phone]       [facility] [address] [place] [address]        Equal Access to Services     PARTHA VALADEZ : Hadii aad ku hadasho Soomaali, waaxda luqadaha, qaybta kaalmada adeegyada, waxay idiin hayaan aureliano davis . So [facility] [phone].    ATENCIÓN: Si habla español, tiene a sage disposición servicios gratuitos de asistencia lingüística. Llame al [phone].    We comply with applicable federal civil rights laws and [place] laws. We do not discriminate on the basis of race, color, national origin, age, disability sex, sexual orientation or gender identity.            Thank you!     Thank you for choosing [facility]  for your care. Our goal is always to provide you with excellent care. Hearing back from our patients is one way we can continue to improve our services. Please take a few minutes to complete the written survey that you may receive in the mail after your visit with us. Thank you!             Your Updated Medication List - Protect others around you: Learn how to safely use, store and throw away your  medicines at www.disposemymeds.org.          This list is accurate as of: 7/20/17  2:07 PM.  Always use your most recent med list.                   Brand Name Dispense Instructions for use Diagnosis    leuprolide 45 MG kit    ELIGARD     Inject 45 mg Subcutaneous every 6 months

## 2017-07-20 NOTE — PATIENT INSTRUCTIONS
Please contact [facility] RN with questions or concerns following today's appointment: [phone].    Thank you!

## 2017-07-21 ENCOUNTER — APPOINTMENT (OUTPATIENT)
Dept: RADIATION ONCOLOGY | Facility: CLINIC | Age: 57
End: 2017-07-21
Payer: COMMERCIAL

## 2017-07-21 PROCEDURE — 77385 ZZC IMRT TREATMENT DELIVERY, SIMPLE: CPT | Performed by: RADIOLOGY

## 2017-07-21 PROCEDURE — 77014 ZZHC CT GUIDE FOR PLACEMENT RADIATION THERAPY FIELDS: CPT | Performed by: RADIOLOGY

## 2017-07-24 ENCOUNTER — APPOINTMENT (OUTPATIENT)
Dept: RADIATION ONCOLOGY | Facility: CLINIC | Age: 57
End: 2017-07-24
Payer: COMMERCIAL

## 2017-07-24 PROCEDURE — 77385 ZZC IMRT TREATMENT DELIVERY, SIMPLE: CPT | Performed by: RADIOLOGY

## 2017-07-24 PROCEDURE — 77014 ZZHC CT GUIDE FOR PLACEMENT RADIATION THERAPY FIELDS: CPT | Performed by: RADIOLOGY

## 2017-07-25 ENCOUNTER — APPOINTMENT (OUTPATIENT)
Dept: RADIATION ONCOLOGY | Facility: CLINIC | Age: 57
End: 2017-07-25
Payer: COMMERCIAL

## 2017-07-25 PROCEDURE — 77014 ZZHC CT GUIDE FOR PLACEMENT RADIATION THERAPY FIELDS: CPT | Performed by: RADIOLOGY

## 2017-07-25 PROCEDURE — 77427 RADIATION TX MANAGEMENT X5: CPT | Performed by: RADIOLOGY

## 2017-07-25 PROCEDURE — 77336 RADIATION PHYSICS CONSULT: CPT | Performed by: RADIOLOGY

## 2017-07-25 PROCEDURE — 77385 ZZC IMRT TREATMENT DELIVERY, SIMPLE: CPT | Performed by: RADIOLOGY

## 2017-07-26 ENCOUNTER — APPOINTMENT (OUTPATIENT)
Dept: RADIATION ONCOLOGY | Facility: CLINIC | Age: 57
End: 2017-07-26
Payer: COMMERCIAL

## 2017-07-26 PROCEDURE — 77014 ZZHC CT GUIDE FOR PLACEMENT RADIATION THERAPY FIELDS: CPT | Performed by: RADIOLOGY

## 2017-07-26 PROCEDURE — 77385 ZZC IMRT TREATMENT DELIVERY, SIMPLE: CPT | Performed by: RADIOLOGY

## 2017-07-27 ENCOUNTER — OFFICE VISIT (OUTPATIENT)
Dept: RADIATION ONCOLOGY | Facility: CLINIC | Age: 57
End: 2017-07-27
Payer: COMMERCIAL

## 2017-07-27 ENCOUNTER — APPOINTMENT (OUTPATIENT)
Dept: RADIATION ONCOLOGY | Facility: CLINIC | Age: 57
End: 2017-07-27
Payer: COMMERCIAL

## 2017-07-27 VITALS — BODY MASS INDEX: 34.46 KG/M2 | WEIGHT: 220 LBS

## 2017-07-27 DIAGNOSIS — C61 MALIGNANT NEOPLASM OF PROSTATE (H): Primary | ICD-10-CM

## 2017-07-27 PROCEDURE — 99207 ZZC NO BILLABLE SERVICE THIS VISIT: CPT | Performed by: RADIOLOGY

## 2017-07-27 PROCEDURE — 77385 ZZC IMRT TREATMENT DELIVERY, SIMPLE: CPT | Performed by: RADIOLOGY

## 2017-07-27 PROCEDURE — 77014 ZZHC CT GUIDE FOR PLACEMENT RADIATION THERAPY FIELDS: CPT | Performed by: RADIOLOGY

## 2017-07-27 ASSESSMENT — PAIN SCALES - GENERAL: PAINLEVEL: NO PAIN (0)

## 2017-07-27 NOTE — MR AVS SNAPSHOT
After Visit Summary   7/27/2017    Estuardo Bowden    MRN: [id number]           Patient Information     Date Of Birth          1960        Visit Information        Provider Department      7/27/2017 1:15 PM Gina Navarrete MD [facility]        Today's Diagnoses     Malignant neoplasm of prostate (H)    -  1       Follow-ups after your visit        Your next 10 appointments already scheduled     Jul 28, 2017 12:45 PM CDT   TREATMENT with RADIATION THERAPIST   [facility] ([facility]    [address] [facility] [address]   [phone]            Jul 31, 2017 12:45 PM CDT   TREATMENT with RADIATION THERAPIST   [facility] ([facility]    [address] [facility] [address]   [phone]            Aug 01, 2017 12:45 PM CDT   TREATMENT with RADIATION THERAPIST   [facility] ([facility]    [address] [facility] [address]   [phone]            Aug 02, 2017 12:45 PM CDT   TREATMENT with RADIATION THERAPIST   [facility] ([facility])    [address] [facility] [address]   [phone]            Aug 03, 2017 12:45 PM CDT   TREATMENT with RADIATION THERAPIST   [facility] ([facility])    [address] [facility] [address]   [phone]            Aug 03, 2017  1:15 PM CDT   on treatment visit with Gina Navarrete MD   [facility] ([facility])    [address] [facility] [address]   [phone]            Aug 04, 2017 12:45 PM CDT   TREATMENT with RADIATION THERAPIST   [facility] ([facility])    [address] [facility] [address]   [phone]            Aug 07, 2017 12:45 PM CDT   TREATMENT with RADIATION THERAPIST   [facility] ([facility])    [address] [facility] [address]   [phone]            Aug 08, 2017 12:45 PM CDT   TREATMENT with RADIATION THERAPIST   [facility] ([facility])    [address] [facility] [address]   [phone]            Aug 09, 2017 12:45 PM CDT   TREATMENT with RADIATION THERAPIST   [facility] ([facility])    [address] [facility] [address] [phone]              Who to contact     If you have questions or need follow up information about today's clinic visit or your schedule please contact [facility] directly at [phone].  Normal or non-critical lab and imaging results will be communicated to you by uiuhart, letter or phone within 4 business days after the clinic has received the results. If you do not hear from us within 7 days, please contact the clinic through uiuhart or phone. If you have a critical or abnormal lab result, we will notify you by phone as soon as possible.  Submit refill requests through [employer] or call your pharmacy and they will forward the refill request to us. Please allow 3 business days for your refill to be completed.          Additional Information About Your Visit        Ti Knight Information     [employer] gives you secure access to your electronic health record. If you see a primary care provider, you can also send messages to your care team and make appointments. If you have questions, please call your primary care clinic.  If you do not have a primary care provider, please call [phone] and they will assist you.      [employer] Knight is an electronic gateway that provides easy, online access to your medical records. With [employer], you can request a clinic appointment, read your test results, renew a prescription or communicate with your care team.     To access your existing account, please contact your  [facility] or call [phone] for assistance.        Care EveryWhere ID     This is your Care EveryWhere ID. This could be used by other organizations to access your [place] medical records  [id number]        Your Vitals Were     BMI (Body Mass Index)                   34.46 kg/m2            Blood Pressure from Last 3 Encounters:   05/18/17 120/88   05/11/17 110/60   05/10/17 148/80    Weight from Last 3 Encounters:   07/27/17 99.8 kg (220 lb)   07/20/17 99.3 kg (219 lb)   07/13/17 98.4 kg (217 lb)              Today, you had the following     No orders found for display       Primary Care Provider Office Phone # Fax #    Hernandez Luna [phone]       [facility] [address] [place] [address]        Equal Access to Services     [facility] : Hadii aad ku hadasho Soomaali, waaxda luqadaha, qaybta kaalmada adeegyada, waxay idiin hayaan aureliano davis . So [facility] [phone].    ATENCIÓN: Si habla español, tiene a sage disposición servicios gratuitos de asistencia lingüística. Priyank al [phone].    We comply with applicable federal civil rights laws and [place] laws. We do not discriminate on the basis of race, color, national origin, age, disability sex, sexual orientation or gender identity.            Thank you!     Thank you for choosing [facility]  for your care. Our goal is always to provide you with excellent care. Hearing back from our patients is one way we can continue to improve our services. Please take a few minutes to complete the written survey that you may receive in the mail after your visit with us. Thank you!             Your Updated Medication List - Protect others around you: Learn how to safely use, store and throw away your medicines at www.disposemymeds.org.          This list is accurate as of: 7/27/17  1:56 PM.  Always use your most recent med list.                   Brand Name  Dispense Instructions for use Diagnosis    leuprolide 45 MG kit    ELIGARD     Inject 45 mg Subcutaneous every 6 months

## 2017-07-27 NOTE — PROGRESS NOTES
[facility] PHYSICIANS  SPECIALIZING IN BREAKTHROUGHS  Radiation Oncology    On Treatment Visit Note      Estuardo Bowden      Date: 2017   MRN: [id number]   : 1960  Diagnosis: prostate cancer      Reason for Visit:  On Radiation Treatment Visit     Treatment Summary to Date  Treatment Site: prostate_bed Current Dose: 2400/7000 cGy Fractions:       Chemotherapy  Chemo concurrent with radx?: Yes  Drug Name/Frequency 1: jose    Subjective:   Still struggling with regulating his bladder filling for treatment as he has significant urgency. Some fatigue.    Nursing ROS:   Nutrition Alteration  Diet Type: Patient's Preference  Skin  Skin Reaction: 0 - No changes           Gastrointestinal  Nausea: 0 - None  Diarrhea: 0 - None  Genitourinary  Urinary Status: 0 - Normal   Note: Patient reports getting up once per night around 0500 to urinate  Psychosocial  Mood - Anxiety: 0 - Normal  Mood - Depression: 0 - Normal  Pyschosocial Note: Patient reports tiring more easily, but does not feel generally fatigued  Pain Assessment  0-10 Pain Scale: 0      Objective:   Wt 99.8 kg (220 lb)  BMI 34.46 kg/m2   No change.    Labs:  CBC RESULTS:   Recent Labs   Lab Test  17   0959   WBC  7.4   RBC  4.74   HGB  15.3   HCT  44.2   MCV  93   MCH  32.3   MCHC  34.6   RDW  15.3*   PLT  243     ELECTROLYTES:  Recent Labs   Lab Test  04/22/15   0829   NA  138   POTASSIUM  4.2   CHLORIDE  105   DOLORES  9.0   CO2  28   BUN  12   CR  1.01   GLC  96       Assessment:    Tolerating radiation therapy well.  All questions and concerns addressed.    Plan:   1. Continue current therapy.        Mosaiq chart and setup information reviewed      Medication Review  Med list reviewed with patient?: Yes           Gina Navarrete MD

## 2017-07-28 ENCOUNTER — APPOINTMENT (OUTPATIENT)
Dept: RADIATION ONCOLOGY | Facility: CLINIC | Age: 57
End: 2017-07-28
Payer: COMMERCIAL

## 2017-07-28 PROCEDURE — 77385 ZZC IMRT TREATMENT DELIVERY, SIMPLE: CPT | Performed by: RADIOLOGY

## 2017-07-28 PROCEDURE — 77014 ZZHC CT GUIDE FOR PLACEMENT RADIATION THERAPY FIELDS: CPT | Performed by: RADIOLOGY

## 2017-07-31 ENCOUNTER — APPOINTMENT (OUTPATIENT)
Dept: RADIATION ONCOLOGY | Facility: CLINIC | Age: 57
End: 2017-07-31
Payer: COMMERCIAL

## 2017-07-31 PROCEDURE — 77014 ZZHC CT GUIDE FOR PLACEMENT RADIATION THERAPY FIELDS: CPT | Performed by: RADIOLOGY

## 2017-07-31 PROCEDURE — 77385 ZZC IMRT TREATMENT DELIVERY, SIMPLE: CPT | Performed by: RADIOLOGY

## 2017-08-01 ENCOUNTER — APPOINTMENT (OUTPATIENT)
Dept: RADIATION ONCOLOGY | Facility: CLINIC | Age: 57
End: 2017-08-01
Payer: COMMERCIAL

## 2017-08-01 PROCEDURE — 77014 ZZHC CT GUIDE FOR PLACEMENT RADIATION THERAPY FIELDS: CPT | Performed by: RADIOLOGY

## 2017-08-01 PROCEDURE — 77336 RADIATION PHYSICS CONSULT: CPT | Performed by: RADIOLOGY

## 2017-08-01 PROCEDURE — 77427 RADIATION TX MANAGEMENT X5: CPT | Performed by: RADIOLOGY

## 2017-08-01 PROCEDURE — 77385 ZZC IMRT TREATMENT DELIVERY, SIMPLE: CPT | Performed by: RADIOLOGY

## 2017-08-02 ENCOUNTER — DOCUMENTATION ONLY (OUTPATIENT)
Dept: SPIRITUAL SERVICES | Facility: CLINIC | Age: 57
End: 2017-08-02

## 2017-08-02 ENCOUNTER — APPOINTMENT (OUTPATIENT)
Dept: RADIATION ONCOLOGY | Facility: CLINIC | Age: 57
End: 2017-08-02
Payer: COMMERCIAL

## 2017-08-02 DIAGNOSIS — Z71.81 SPIRITUAL OR RELIGIOUS COUNSELING: Primary | ICD-10-CM

## 2017-08-02 PROCEDURE — 77014 ZZHC CT GUIDE FOR PLACEMENT RADIATION THERAPY FIELDS: CPT | Performed by: RADIOLOGY

## 2017-08-02 PROCEDURE — 77385 ZZC IMRT TREATMENT DELIVERY, SIMPLE: CPT | Performed by: RADIOLOGY

## 2017-08-02 NOTE — PROGRESS NOTES
SPIRITUAL HEALTH SERVICES  SPIRITUAL ASSESSMENT Progress Note  [facility]     introduced himself to Estuardo Bowden and informed him of his availability.    Mahesh Perez M.Div., [facility]  Staff   Office tel: [phone]

## 2017-08-03 ENCOUNTER — OFFICE VISIT (OUTPATIENT)
Dept: RADIATION ONCOLOGY | Facility: CLINIC | Age: 57
End: 2017-08-03
Payer: COMMERCIAL

## 2017-08-03 ENCOUNTER — APPOINTMENT (OUTPATIENT)
Dept: RADIATION ONCOLOGY | Facility: CLINIC | Age: 57
End: 2017-08-03
Payer: COMMERCIAL

## 2017-08-03 VITALS — BODY MASS INDEX: 33.83 KG/M2 | WEIGHT: 216 LBS

## 2017-08-03 DIAGNOSIS — C61 MALIGNANT NEOPLASM OF PROSTATE (H): Primary | ICD-10-CM

## 2017-08-03 PROCEDURE — 99207 ZZC NO BILLABLE SERVICE THIS VISIT: CPT | Performed by: RADIOLOGY

## 2017-08-03 PROCEDURE — 77014 ZZHC CT GUIDE FOR PLACEMENT RADIATION THERAPY FIELDS: CPT | Performed by: RADIOLOGY

## 2017-08-03 PROCEDURE — 77385 ZZC IMRT TREATMENT DELIVERY, SIMPLE: CPT | Performed by: RADIOLOGY

## 2017-08-03 NOTE — PROGRESS NOTES
[facility] PHYSICIANS  SPECIALIZING IN BREAKTHROUGHS  Radiation Oncology    On Treatment Visit Note      Estuardo Bowden      Date: 8/3/2017   MRN: [id number]   : 1960  Diagnosis: prostate cancer      Reason for Visit:  On Radiation Treatment Visit     Treatment Summary to Date  Treatment Site: prostate_bed Current Dose: 3400/7000 cGy Fractions:       Chemotherapy  Chemo concurrent with radx?: Yes  Drug Name/Frequency 1: naifmichelle    Subjective:   No bowel sx. Mild urinary freq, but not requiring meds. Has ED, since surgery. Erections not firm enough for activity.    Nursing ROS:   Nutrition Alteration  Diet Type: Patient's Preference              Gastrointestinal  Diarrhea: 0 - None  Genitourinary  Urinary Status: 1 - Increase in frequency or nocturia up to 2x normal   Note: Patient reports increased frequency/urgency  Psychosocial  Mood - Anxiety: 0 - Normal  Mood - Depression: 0 - Normal  Pyschosocial Note: Patient reports increased fatigue, especially in the afternoons while at work.  Pain Assessment  0-10 Pain Scale: 0      Objective:   Wt 216 lb  BMI 33.83 kg/m2  NAD  Labs:  CBC RESULTS:   Recent Labs   Lab Test  17   0959   WBC  7.4   RBC  4.74   HGB  15.3   HCT  44.2   MCV  93   MCH  32.3   MCHC  34.6   RDW  15.3*   PLT  243     ELECTROLYTES:  Recent Labs   Lab Test  04/22/15   0829   NA  138   POTASSIUM  4.2   CHLORIDE  105   DOLORES  9.0   CO2  28   BUN  12   CR  1.01   GLC  96       Assessment:    Tolerating radiation therapy well.  All questions and concerns addressed.    Plan:   1. Continue current therapy.  Discussed ED medication, to be addressed after completion of treatment.      Mosaiq chart and setup information reviewed  MVCT/IGRT images checked    Medication Review  Med list reviewed with patient?: Yes           Shaun James MD

## 2017-08-03 NOTE — MR AVS SNAPSHOT
After Visit Summary   8/3/2017    Estuardo Bowden    MRN: [id number]           Patient Information     Date Of Birth          1960        Visit Information        Provider Department      8/3/2017 1:15 PM Shaun James MD [facility]        Today's Diagnoses     Malignant neoplasm of prostate (H)    -  1       Follow-ups after your visit        Your next 10 appointments already scheduled     Aug 04, 2017 12:45 PM CDT   TREATMENT with RADIATION THERAPIST   [facility] ([facility]    [address] [facility] [address]   [phone]            Aug 07, 2017 12:45 PM CDT   TREATMENT with RADIATION THERAPIST   [facility] ([facility])    [address] [facility] [address]   [phone]            Aug 08, 2017 12:45 PM CDT   TREATMENT with RADIATION THERAPIST   [facility] ([facility])    [address] [facility] [address]   [phone]            Aug 09, 2017 12:45 PM CDT   TREATMENT with RADIATION THERAPIST   [facility] ([facility])    [address] [facility] [address]   [phone]            Aug 10, 2017 12:45 PM CDT   TREATMENT with RADIATION THERAPIST   [facility] ([facility])    [address] [facility] [address]   [phone]            Aug 10, 2017  1:00 PM CDT   on treatment visit with Shaun James MD   [facility] ([facility])    [address] [facility] [address]   [phone]            Aug 11, 2017 12:45 PM CDT   TREATMENT with RADIATION THERAPIST   [facility] ([facility])    [address] [facility] [address]   [phone]            Aug 14, 2017 12:45 PM CDT   TREATMENT with RADIATION THERAPIST   [facility] ([facility])    [address] [address] [phone]            Aug 15, 2017 12:45 PM CDT   TREATMENT with RADIATION THERAPIST   [facility] ([facility])    [address] [facility] [address] [phone]            Aug 16, 2017 12:45 PM CDT   TREATMENT with RADIATION THERAPIST   [facility] ([facility])    [address] [address] [phone]              Who to contact     If you have questions or need follow up information about today's clinic visit or your schedule please contact [facility] directly at [phone].  Normal or non-critical lab and imaging results will be communicated to you by Waste Remedieshart, letter or phone within 4 business days after the clinic has received the results. If you do not hear from us within 7 days, please contact the clinic through [employer] or phone. If you have a critical or abnormal lab result, we will notify you by phone as soon as possible.  Submit refill requests through NanoH2O or call your pharmacy and they will forward the refill request to us. Please allow 3 business days for your refill to be completed.          Additional Information About Your Visit        NanoH2O Information     [employer] gives you secure access to your electronic health record. If you see a primary care provider, you can also send messages to your care team and make appointments. If you have questions, please call your primary care clinic.  If you do not have a primary care provider, please call [phone] and they will assist you.      NanoH[employer]O is an electronic gateway that provides easy, online access to your medical records. With [employer]H2O, you can request a clinic appointment, read your test results, renew a prescription or communicate with your care team.     To access your existing account, please contact your [facility]  [place] [facility] or call [phone] for assistance.        Care EveryWhere ID     This is your Care EveryWhere ID. This could be used by other organizations to access your [place] medical records  [id number]        Your Vitals Were     BMI (Body Mass Index)                   33.83 kg/m2            Blood Pressure from Last 3 Encounters:   05/18/17 120/88   05/11/17 110/60   05/10/17 148/80    Weight from Last 3 Encounters:   08/03/17 216 lb   07/27/17 220 lb   07/20/17 219 lb              Today, you had the following     No orders found for display       Primary Care Provider Office Phone # Fax #    Hernandez Luna [phone]       [facility] [address] [facility] [address]        Equal Access to Services     PARTHA VALADEZ : Hadii aad ku hadasho Soomaali, waaxda luqadaha, qaybta kaalmada adeegyada, waxay zenaidain hayaan aureliano davis . So [facility] [phone].    ATENCIÓN: Si habla español, tiene a sage disposición servicios gratuitos de asistencia lingüística. Priyank al [phone].    We comply with applicable federal civil rights laws and [place] laws. We do not discriminate on the basis of race, color, national origin, age, disability sex, sexual orientation or gender identity.            Thank you!     Thank you for choosing [facility]  for your care. Our goal is always to provide you with excellent care. Hearing back from our patients is one way we can continue to improve our services. Please take a few minutes to complete the written survey that you may receive in the mail after your visit with us. Thank you!             Your Updated Medication List - Protect others around you: Learn how to safely use, store and throw away your medicines at www.disposemymeds.org.          This list is accurate as of: 8/3/17  1:21 PM.  Always use your most recent med list.                   Brand Name Dispense Instructions for use Diagnosis     leuprolide 45 MG kit    ELIGARD     Inject 45 mg Subcutaneous every 6 months

## 2017-08-04 ENCOUNTER — APPOINTMENT (OUTPATIENT)
Dept: RADIATION ONCOLOGY | Facility: CLINIC | Age: 57
End: 2017-08-04
Payer: COMMERCIAL

## 2017-08-04 PROCEDURE — 77014 ZZHC CT GUIDE FOR PLACEMENT RADIATION THERAPY FIELDS: CPT | Performed by: RADIOLOGY

## 2017-08-04 PROCEDURE — 77385 ZZC IMRT TREATMENT DELIVERY, SIMPLE: CPT | Performed by: RADIOLOGY

## 2017-08-07 ENCOUNTER — APPOINTMENT (OUTPATIENT)
Dept: RADIATION ONCOLOGY | Facility: CLINIC | Age: 57
End: 2017-08-07
Payer: COMMERCIAL

## 2017-08-07 PROCEDURE — 77385 ZZC IMRT TREATMENT DELIVERY, SIMPLE: CPT | Performed by: RADIOLOGY

## 2017-08-07 PROCEDURE — 77014 ZZHC CT GUIDE FOR PLACEMENT RADIATION THERAPY FIELDS: CPT | Performed by: RADIOLOGY

## 2017-08-08 ENCOUNTER — APPOINTMENT (OUTPATIENT)
Dept: RADIATION ONCOLOGY | Facility: CLINIC | Age: 57
End: 2017-08-08
Payer: COMMERCIAL

## 2017-08-08 PROCEDURE — 77427 RADIATION TX MANAGEMENT X5: CPT | Performed by: RADIOLOGY

## 2017-08-08 PROCEDURE — 77014 ZZHC CT GUIDE FOR PLACEMENT RADIATION THERAPY FIELDS: CPT | Performed by: RADIOLOGY

## 2017-08-08 PROCEDURE — 77385 ZZC IMRT TREATMENT DELIVERY, SIMPLE: CPT | Performed by: RADIOLOGY

## 2017-08-08 PROCEDURE — 77336 RADIATION PHYSICS CONSULT: CPT | Performed by: RADIOLOGY

## 2017-08-09 ENCOUNTER — APPOINTMENT (OUTPATIENT)
Dept: RADIATION ONCOLOGY | Facility: CLINIC | Age: 57
End: 2017-08-09
Payer: COMMERCIAL

## 2017-08-09 PROCEDURE — 77014 ZZHC CT GUIDE FOR PLACEMENT RADIATION THERAPY FIELDS: CPT | Performed by: RADIOLOGY

## 2017-08-09 PROCEDURE — 77385 ZZC IMRT TREATMENT DELIVERY, SIMPLE: CPT | Performed by: RADIOLOGY

## 2017-08-10 ENCOUNTER — OFFICE VISIT (OUTPATIENT)
Dept: RADIATION ONCOLOGY | Facility: CLINIC | Age: 57
End: 2017-08-10
Payer: COMMERCIAL

## 2017-08-10 ENCOUNTER — APPOINTMENT (OUTPATIENT)
Dept: RADIATION ONCOLOGY | Facility: CLINIC | Age: 57
End: 2017-08-10
Payer: COMMERCIAL

## 2017-08-10 VITALS — BODY MASS INDEX: 33.99 KG/M2 | WEIGHT: 217 LBS

## 2017-08-10 DIAGNOSIS — C61 MALIGNANT NEOPLASM OF PROSTATE (H): Primary | ICD-10-CM

## 2017-08-10 PROCEDURE — 77014 ZZHC CT GUIDE FOR PLACEMENT RADIATION THERAPY FIELDS: CPT | Performed by: RADIOLOGY

## 2017-08-10 PROCEDURE — 99207 ZZC DROP WITH A PROCEDURE: CPT | Performed by: RADIOLOGY

## 2017-08-10 PROCEDURE — 77385 ZZC IMRT TREATMENT DELIVERY, SIMPLE: CPT | Performed by: RADIOLOGY

## 2017-08-10 ASSESSMENT — PAIN SCALES - GENERAL: PAINLEVEL: NO PAIN (0)

## 2017-08-10 NOTE — PROGRESS NOTES
[facility] PHYSICIANS  SPECIALIZING IN BREAKTHROUGHS  Radiation Oncology    On Treatment Visit Note      Estuardo Bowden      Date: 8/10/2017   MRN: [id number]   : 1960  Diagnosis: prostate cancer      Reason for Visit:  On Radiation Treatment Visit     Treatment Summary to Date  Treatment Site: prostate_bed Current Dose: 4400/7000 cGy Fractions:       Chemotherapy  Chemo concurrent with radx?: Yes  Drug Name/Frequency 1: nathalyjacob    Subjective:   Tolerating RT well; mild increase in BM freq but no diarrhea; mild urinary freq but not requiring meds.    Nursing ROS:   Nutrition Alteration  Diet Type: Patient's Preference              Gastrointestinal  Nausea: 0 - None  Diarrhea: 0 - None  GI Note: patient reports looser bowel stool but no a increas in bowel movements   Genitourinary  Urinary Status: 1 - Increase in frequency or nocturia up to 2x normal   Note: Patient reports increased frequency/urgency  Psychosocial  Mood - Anxiety: 0 - Normal  Mood - Depression: 0 - Normal  Pyschosocial Note: Patient reports increased fatigue, especially in the afternoons while at work.  Pain Assessment  0-10 Pain Scale: 0      Objective:   Wt 217 lb  BMI 33.99 kg/m2  NAD  Labs:  CBC RESULTS:   Recent Labs   Lab Test  17   0959   WBC  7.4   RBC  4.74   HGB  15.3   HCT  44.2   MCV  93   MCH  32.3   MCHC  34.6   RDW  15.3*   PLT  243     ELECTROLYTES:  Recent Labs   Lab Test  04/22/15   0829   NA  138   POTASSIUM  4.2   CHLORIDE  105   DOLORES  9.0   CO2  28   BUN  12   CR  1.01   GLC  96       Assessment:    Tolerating radiation therapy well.  All questions and concerns addressed.    Plan:   1. Continue current therapy.        Mosaiq chart and setup information reviewed  MVCT/IGRT images checked    Medication Review  Med list reviewed with patient?: Yes           Shaun James MD

## 2017-08-10 NOTE — PATIENT INSTRUCTIONS
Please contact [facility] RN with questions or concerns following today's appointment: [phone].    Thank you!

## 2017-08-11 ENCOUNTER — APPOINTMENT (OUTPATIENT)
Dept: RADIATION ONCOLOGY | Facility: CLINIC | Age: 57
End: 2017-08-11
Payer: COMMERCIAL

## 2017-08-11 PROCEDURE — 77385 ZZC IMRT TREATMENT DELIVERY, SIMPLE: CPT | Performed by: RADIOLOGY

## 2017-08-11 PROCEDURE — 77014 ZZHC CT GUIDE FOR PLACEMENT RADIATION THERAPY FIELDS: CPT | Performed by: RADIOLOGY

## 2017-08-14 ENCOUNTER — APPOINTMENT (OUTPATIENT)
Dept: RADIATION ONCOLOGY | Facility: CLINIC | Age: 57
End: 2017-08-14
Payer: COMMERCIAL

## 2017-08-14 PROCEDURE — 77014 ZZHC CT GUIDE FOR PLACEMENT RADIATION THERAPY FIELDS: CPT | Performed by: RADIOLOGY

## 2017-08-14 PROCEDURE — 77385 ZZC IMRT TREATMENT DELIVERY, SIMPLE: CPT | Performed by: RADIOLOGY

## 2017-08-15 ENCOUNTER — APPOINTMENT (OUTPATIENT)
Dept: RADIATION ONCOLOGY | Facility: CLINIC | Age: 57
End: 2017-08-15
Payer: COMMERCIAL

## 2017-08-15 PROCEDURE — 77014 ZZHC CT GUIDE FOR PLACEMENT RADIATION THERAPY FIELDS: CPT | Performed by: RADIOLOGY

## 2017-08-15 PROCEDURE — 77427 RADIATION TX MANAGEMENT X5: CPT | Performed by: RADIOLOGY

## 2017-08-15 PROCEDURE — 77385 ZZC IMRT TREATMENT DELIVERY, SIMPLE: CPT | Performed by: RADIOLOGY

## 2017-08-15 PROCEDURE — 77336 RADIATION PHYSICS CONSULT: CPT | Performed by: RADIOLOGY

## 2017-08-16 ENCOUNTER — APPOINTMENT (OUTPATIENT)
Dept: RADIATION ONCOLOGY | Facility: CLINIC | Age: 57
End: 2017-08-16
Payer: COMMERCIAL

## 2017-08-16 PROCEDURE — 77385 ZZC IMRT TREATMENT DELIVERY, SIMPLE: CPT | Performed by: RADIOLOGY

## 2017-08-16 PROCEDURE — 77014 ZZHC CT GUIDE FOR PLACEMENT RADIATION THERAPY FIELDS: CPT | Performed by: RADIOLOGY

## 2017-08-17 ENCOUNTER — OFFICE VISIT (OUTPATIENT)
Dept: RADIATION ONCOLOGY | Facility: CLINIC | Age: 57
End: 2017-08-17
Payer: COMMERCIAL

## 2017-08-17 ENCOUNTER — APPOINTMENT (OUTPATIENT)
Dept: RADIATION ONCOLOGY | Facility: CLINIC | Age: 57
End: 2017-08-17
Payer: COMMERCIAL

## 2017-08-17 VITALS — BODY MASS INDEX: 33.83 KG/M2 | WEIGHT: 216 LBS

## 2017-08-17 DIAGNOSIS — C61 MALIGNANT NEOPLASM OF PROSTATE (H): Primary | ICD-10-CM

## 2017-08-17 PROCEDURE — 77014 ZZHC CT GUIDE FOR PLACEMENT RADIATION THERAPY FIELDS: CPT | Performed by: RADIOLOGY

## 2017-08-17 PROCEDURE — 77385 ZZC IMRT TREATMENT DELIVERY, SIMPLE: CPT | Performed by: RADIOLOGY

## 2017-08-17 PROCEDURE — 99207 ZZC DROP WITH A PROCEDURE: CPT | Performed by: RADIOLOGY

## 2017-08-17 ASSESSMENT — PAIN SCALES - GENERAL: PAINLEVEL: NO PAIN (0)

## 2017-08-17 NOTE — PATIENT INSTRUCTIONS
Please contact [facility] RN with questions or concerns following today's appointment: [phone].    Thank you!

## 2017-08-17 NOTE — MR AVS SNAPSHOT
After Visit Summary   8/17/2017    Estuardo Bowden    MRN: [id number]           Patient Information     Date Of Birth          1960        Visit Information        Provider Department      8/17/2017 1:00 PM Shaun James MD [facility]        Today's Diagnoses     Malignant neoplasm of prostate (H)    -  1      Care Instructions    [facility] with questions or concerns following today's appointment: [phone].    Thank you!            Follow-ups after your visit        Your next 10 appointments already scheduled     Aug 18, 2017 12:45 PM CDT   TREATMENT with RADIATION THERAPIST   [facility] ([facility]    [address] [facility] [address]   [phone]            Aug 21, 2017 12:45 PM CDT   TREATMENT with RADIATION THERAPIST   [facility] ([facility])    [address] [facility] [address]   [phone]            Aug 22, 2017 12:45 PM CDT   TREATMENT with RADIATION THERAPIST   [facility]    [address] [facility] [address]   [phone]            Aug 23, 2017 12:45 PM CDT   TREATMENT with RADIATION THERAPIST   [facility] ([facility])    [address] [facility] [address]   [phone]            Aug 24, 2017 12:45 PM CDT   TREATMENT with RADIATION THERAPIST   [facility] ([facility]    [address] [facility] [address]   [phone]            Aug 24, 2017  1:00 PM CDT   on treatment visit with Shaun James MD   [facility] ([facility])    [address] [facility] [address]   [phone]            Aug 25, 2017 12:45 PM CDT   TREATMENT with RADIATION THERAPIST   [facility] ([facility])    [address] [place] [address]   [phone]            Aug 28, 2017 12:45 PM CDT   TREATMENT with RADIATION THERAPIST   [facility] ([facility])    [address] [facility] [address]   [phone]            Aug 29, 2017 12:45 PM CDT   TREATMENT with RADIATION THERAPIST   [facility] ([facility])    [address] [place] [address] [phone]            Aug 29, 2017  1:00 PM CDT   on treatment visit with Shaun James MD   [facility] ([facility])    [address] [place] [address] [phone]              Who to contact     If you have questions or need follow up information about today's clinic visit or your schedule please contact [facility] directly at [phone].  Normal or non-critical lab and imaging results will be communicated to you by [employer], letter or phone within 4 business days after the clinic has received the results. If you do not hear from us within 7 days, please contact the clinic through [employer] or phone. If you have a critical or abnormal lab result, we will notify you by phone as soon as possible.  Submit refill requests through [employer] or call your pharmacy and they will forward the refill request to us. Please allow 3 business days for your refill to be completed.          Additional Information About Your Visit        [employer] Information     [employer] gives you secure access to your electronic health record. If you see a primary care provider, you can also send messages to your care team and make appointments. If you have questions, please call your primary care clinic.  If you do not have a primary care provider, please call [phone] and they will assist you.      [employer] is an electronic gateway that provides easy, online access to your medical records. With [employer], you can  request a clinic appointment, read your test results, renew a prescription or communicate with your care team.     To access your existing account, please contact your [facility] or call [phone] for assistance.        Care EveryWhere ID     This is your Care EveryWhere ID. This could be used by other organizations to access your [place] medical records  [id number]        Your Vitals Were     BMI (Body Mass Index)                   33.83 kg/m2            Blood Pressure from Last 3 Encounters:   05/18/17 120/88   05/11/17 110/60   05/10/17 148/80    Weight from Last 3 Encounters:   08/17/17 216 lb   08/10/17 217 lb   08/03/17 216 lb              Today, you had the following     No orders found for display       Primary Care Provider Office Phone # Fax #    Hernandez Luna [phone] [address] [facility] [address]        Equal Access to Services     [facility] Hospital[facility] : Hadii aad ku hadasho Soomaali, waaxda luqadaha, qaybta kaalmada adeegyada, waxay zenaidain hayjimmien aureliano davis . So [facility] [phone].    ATENCIÓN: Si habla español, tiene a sage disposición servicios gratuitos de asistencia lingüística. Llame al [phone].    We comply with applicable federal civil rights laws and [place] laws. We do not discriminate on the basis of race, color, national origin, age, disability sex, sexual orientation or gender identity.            Thank you!     Thank you for choosing [facility]  for your care. Our goal is always to provide you with excellent care. Hearing back from our patients is one way we can continue to improve our services. Please take a few minutes to complete the written survey that you may receive in the mail after your visit with us. Thank you!             Your Updated Medication List - Protect others around you: Learn how to safely use, store and throw away your medicines at www.disposemymeds.org.          This  list is accurate as of: 8/17/17  1:47 PM.  Always use your most recent med list.                   Brand Name Dispense Instructions for use Diagnosis    leuprolide 45 MG kit    ELIGARD     Inject 45 mg Subcutaneous every 6 months

## 2017-08-17 NOTE — PROGRESS NOTES
[facility] PHYSICIANS  SPECIALIZING IN BREAKTHROUGHS  Radiation Oncology    On Treatment Visit Note      Estuardo Bowden      Date: 2017   MRN: [id number]   : 1960  Diagnosis: prostate cancer      Reason for Visit:  On Radiation Treatment Visit     Treatment Summary to Date  Treatment Site: prostate_bed Current Dose: 5400/7000 cGy Fractions:       Chemotherapy  Chemo concurrent with radx?: Yes  Drug Name/Frequency 1: jose    Subjective:   He continues to tolerate radiation therapy relatively well. He has about 3 or 4 loose bowel movements a day, but no diarrhea, and he is not bothered by this. No significant urinary symptoms. He has occasional hot flashes.    Nursing ROS:   Nutrition Alteration  Diet Type: Patient's Preference              Gastrointestinal  Nausea: 0 - None  Diarrhea: 0 - None  GI Note: patient reports looser bowel stool but no a increase in bowel movements   Genitourinary  Urinary Status: 1 - Increase in frequency or nocturia up to 2x normal   Note: Patient reports increased frequency/urgency  Psychosocial  Mood - Anxiety: 0 - Normal  Mood - Depression: 0 - Normal  Pyschosocial Note: Patient reports increased fatigue, especially in the afternoons while at work.  Pain Assessment  0-10 Pain Scale: 0      Objective:   Wt 216 lb  BMI 33.83 kg/m2  No acute distress    Labs:  CBC RESULTS:   Recent Labs   Lab Test  17   0959   WBC  7.4   RBC  4.74   HGB  15.3   HCT  44.2   MCV  93   MCH  32.3   MCHC  34.6   RDW  15.3*   PLT  243     ELECTROLYTES:  Recent Labs   Lab Test  04/22/15   0829   NA  138   POTASSIUM  4.2   CHLORIDE  105   DOLORES  9.0   CO2  28   BUN  12   CR  1.01   GLC  96       Assessment:    Tolerating radiation therapy well.  All questions and concerns addressed.    Plan:   1. Continue current therapy.        Tap 'n Tapiq chart and setup information reviewed  MVCT/IGRT images checked    Medication Review  Med list reviewed with patient?: Yes            Shaun James MD

## 2017-08-18 ENCOUNTER — APPOINTMENT (OUTPATIENT)
Dept: RADIATION ONCOLOGY | Facility: CLINIC | Age: 57
End: 2017-08-18
Payer: COMMERCIAL

## 2017-08-18 PROCEDURE — 77014 ZZHC CT GUIDE FOR PLACEMENT RADIATION THERAPY FIELDS: CPT | Performed by: RADIOLOGY

## 2017-08-18 PROCEDURE — 77385 ZZC IMRT TREATMENT DELIVERY, SIMPLE: CPT | Performed by: RADIOLOGY

## 2017-08-21 ENCOUNTER — APPOINTMENT (OUTPATIENT)
Dept: RADIATION ONCOLOGY | Facility: CLINIC | Age: 57
End: 2017-08-21
Payer: COMMERCIAL

## 2017-08-21 PROCEDURE — 77014 ZZHC CT GUIDE FOR PLACEMENT RADIATION THERAPY FIELDS: CPT | Performed by: RADIOLOGY

## 2017-08-21 PROCEDURE — 77385 ZZC IMRT TREATMENT DELIVERY, SIMPLE: CPT | Performed by: RADIOLOGY

## 2017-08-22 ENCOUNTER — APPOINTMENT (OUTPATIENT)
Dept: RADIATION ONCOLOGY | Facility: CLINIC | Age: 57
End: 2017-08-22
Payer: COMMERCIAL

## 2017-08-22 PROCEDURE — 77385 ZZC IMRT TREATMENT DELIVERY, SIMPLE: CPT | Performed by: RADIOLOGY

## 2017-08-22 PROCEDURE — 77427 RADIATION TX MANAGEMENT X5: CPT | Performed by: RADIOLOGY

## 2017-08-22 PROCEDURE — 77014 ZZHC CT GUIDE FOR PLACEMENT RADIATION THERAPY FIELDS: CPT | Performed by: RADIOLOGY

## 2017-08-22 PROCEDURE — 77336 RADIATION PHYSICS CONSULT: CPT | Performed by: RADIOLOGY

## 2017-08-23 ENCOUNTER — APPOINTMENT (OUTPATIENT)
Dept: RADIATION ONCOLOGY | Facility: CLINIC | Age: 57
End: 2017-08-23
Payer: COMMERCIAL

## 2017-08-23 PROCEDURE — 77014 ZZHC CT GUIDE FOR PLACEMENT RADIATION THERAPY FIELDS: CPT | Performed by: RADIOLOGY

## 2017-08-23 PROCEDURE — 77385 ZZC IMRT TREATMENT DELIVERY, SIMPLE: CPT | Performed by: RADIOLOGY

## 2017-08-24 ENCOUNTER — OFFICE VISIT (OUTPATIENT)
Dept: RADIATION ONCOLOGY | Facility: CLINIC | Age: 57
End: 2017-08-24
Payer: COMMERCIAL

## 2017-08-24 ENCOUNTER — APPOINTMENT (OUTPATIENT)
Dept: RADIATION ONCOLOGY | Facility: CLINIC | Age: 57
End: 2017-08-24
Payer: COMMERCIAL

## 2017-08-24 VITALS — WEIGHT: 217 LBS | BODY MASS INDEX: 33.99 KG/M2

## 2017-08-24 DIAGNOSIS — C61 MALIGNANT NEOPLASM OF PROSTATE (H): Primary | ICD-10-CM

## 2017-08-24 PROCEDURE — 77385 ZZC IMRT TREATMENT DELIVERY, SIMPLE: CPT | Performed by: RADIOLOGY

## 2017-08-24 PROCEDURE — 77014 ZZHC CT GUIDE FOR PLACEMENT RADIATION THERAPY FIELDS: CPT | Performed by: RADIOLOGY

## 2017-08-24 PROCEDURE — 99207 ZZC DROP WITH A PROCEDURE: CPT | Performed by: RADIOLOGY

## 2017-08-24 ASSESSMENT — PAIN SCALES - GENERAL: PAINLEVEL: NO PAIN (0)

## 2017-08-24 NOTE — PROGRESS NOTES
[facility] PHYSICIANS  SPECIALIZING IN BREAKTHROUGHS  Radiation Oncology    On Treatment Visit Note      Estuardo Bowden      Date: 2017   MRN: [id number]   : 1960  Diagnosis: prostate cancer      Reason for Visit:  On Radiation Treatment Visit     Treatment Summary to Date  Treatment Site: prostate_bed Current Dose: 6400/7000 cGy Fractions: 32/35      Chemotherapy  Chemo concurrent with radx?: Yes  Drug Name/Frequency 1: jose    Subjective:   Had some diarrhea last week and took Imodium with good relief. No change in urinary symptoms.    Nursing ROS:   Nutrition Alteration  Diet Type: Patient's Preference              Gastrointestinal  Nausea: 0 - None  Diarrhea: 0 - None  GI Note: patient reports taking imodium  Genitourinary  Urinary Status: 1 - Increase in frequency or nocturia up to 2x normal   Note: Patient reports increased frequency/urgency  Psychosocial  Mood - Anxiety: 0 - Normal  Mood - Depression: 0 - Normal  Pyschosocial Note: Patient reports increased fatigue, especially in the afternoons while at work.  Pain Assessment  0-10 Pain Scale: 0      Objective:   Wt 217 lb  BMI 33.99 kg/m2  No acute distress    Labs:  CBC RESULTS:   Recent Labs   Lab Test  17   0959   WBC  7.4   RBC  4.74   HGB  15.3   HCT  44.2   MCV  93   MCH  32.3   MCHC  34.6   RDW  15.3*   PLT  243     ELECTROLYTES:  Recent Labs   Lab Test  04/22/15   0829   NA  138   POTASSIUM  4.2   CHLORIDE  105   DOLORES  9.0   CO2  28   BUN  12   CR  1.01   GLC  96       Assessment:    Tolerating radiation therapy well.  All questions and concerns addressed.    Plan:   1. Continue current therapy.  We'll schedule follow-up with myself in 3 months with PSA.      Mosaiq chart and setup information reviewed  MVCT/IGRT images checked    Medication Review  Med list reviewed with patient?: Yes    Educational Topic Discussed  Education Instructions: patient has follow with Dr James in 3 months      Shaun James,  MD

## 2017-08-24 NOTE — MR AVS SNAPSHOT
After Visit Summary   8/24/2017    Estuardo Bowden    MRN: [id number]           Patient Information     Date Of Birth          1960        Visit Information        Provider Department      8/24/2017 1:00 PM Shaun James MD [facility]        Today's Diagnoses     Malignant neoplasm of prostate (H)    -  1      Care Instructions    Follow up with Dr James on 12/4/17at 930 am    [facility] with questions or concerns following today's appointment: [phone].    Thank you!            Follow-ups after your visit        Your next 10 appointments already scheduled     Aug 25, 2017 12:45 PM CDT   TREATMENT with RADIATION THERAPIST   [facility] ([facility])    [address] [facility] [address] [phone]            Aug 28, 2017 12:45 PM CDT   TREATMENT with RADIATION THERAPIST   [facility] ([facility])    [address] [facility] [address] [phone]            Aug 29, 2017 12:45 PM CDT   TREATMENT with RADIATION THERAPIST   [facility] ([facility])    [address] [facility] [address] [phone]            Dec 04, 2017  9:30 AM CST   Return Visit with Shaun James MD   [facility] ([facility])    [address] [facility] [address] [phone]              Future tests that were ordered for you today     Open Future Orders        Priority Expected Expires Ordered    PSA tumor marker Routine 11/24/2017 8/24/2018 8/24/2017            Who to contact     If you have questions or need follow up information about today's clinic visit or your schedule please contact [facility] directly at [phone].  Normal or non-critical lab and imaging results will be communicated to you by MyChart, letter or phone within 4  business days after the clinic has received the results. If you do not hear from us within 7 days, please contact the clinic through [employer] or phone. If you have a critical or abnormal lab result, we will notify you by phone as soon as possible.  Submit refill requests through [employer] or call your pharmacy and they will forward the refill request to us. Please allow 3 business days for your refill to be completed.          Additional Information About Your Visit        [employer] Information     [employer] gives you secure access to your electronic health record. If you see a primary care provider, you can also send messages to your care team and make appointments. If you have questions, please call your primary care clinic.  If you do not have a primary care provider, please call [phone] and they will assist you.      [employer] is an electronic gateway that provides easy, online access to your medical records. With [employer], you can request a clinic appointment, read your test results, renew a prescription or communicate with your care team.     To access your existing account, please contact your [facility] or call [phone] for assistance.        Care EveryWhere ID     This is your Care EveryWhere ID. This could be used by other organizations to access your [place] medical records  [id number]        Your Vitals Were     BMI (Body Mass Index)                   33.99 kg/m2            Blood Pressure from Last 3 Encounters:   05/18/17 120/88   05/11/17 110/60   05/10/17 148/80    Weight from Last 3 Encounters:   08/24/17 217 lb   08/17/17 216 lb   08/10/17 217 lb               Primary Care Provider Office Phone # Fax #    Hernandez Luna [phone] [address] [facility] [address]        Equal Access to Services     PARTHA VALADEZ AH: Hadii bob [place], waabielda ubaldo, qaybta lance leary. So  [facility] [phone].    ATENCIÓN: Si morrola celena, tiene a sage disposición servicios gratuitos de asistencia lingüística. Priyank bolton [phone].    We comply with applicable federal civil rights laws and [place] laws. We do not discriminate on the basis of race, color, national origin, age, disability sex, sexual orientation or gender identity.            Thank you!     Thank you for choosing [facility]  for your care. Our goal is always to provide you with excellent care. Hearing back from our patients is one way we can continue to improve our services. Please take a few minutes to complete the written survey that you may receive in the mail after your visit with us. Thank you!             Your Updated Medication List - Protect others around you: Learn how to safely use, store and throw away your medicines at www.disposemymeds.org.          This list is accurate as of: 8/24/17  1:21 PM.  Always use your most recent med list.                   Brand Name Dispense Instructions for use Diagnosis    IMODIUM A-D PO           leuprolide 45 MG kit    ELIGARD     Inject 45 mg Subcutaneous every 6 months

## 2017-08-24 NOTE — PATIENT INSTRUCTIONS
Follow up with Dr James on 12/4/17at 930 am    Please contact [facility] RN with questions or concerns following today's appointment: [phone].    Thank you!

## 2017-08-25 ENCOUNTER — APPOINTMENT (OUTPATIENT)
Dept: RADIATION ONCOLOGY | Facility: CLINIC | Age: 57
End: 2017-08-25
Payer: COMMERCIAL

## 2017-08-25 PROCEDURE — 77385 ZZC IMRT TREATMENT DELIVERY, SIMPLE: CPT | Performed by: RADIOLOGY

## 2017-08-25 PROCEDURE — 77014 ZZHC CT GUIDE FOR PLACEMENT RADIATION THERAPY FIELDS: CPT | Performed by: RADIOLOGY

## 2017-08-28 ENCOUNTER — APPOINTMENT (OUTPATIENT)
Dept: RADIATION ONCOLOGY | Facility: CLINIC | Age: 57
End: 2017-08-28
Payer: COMMERCIAL

## 2017-08-28 PROCEDURE — 77014 ZZHC CT GUIDE FOR PLACEMENT RADIATION THERAPY FIELDS: CPT | Performed by: RADIOLOGY

## 2017-08-28 PROCEDURE — 77385 ZZC IMRT TREATMENT DELIVERY, SIMPLE: CPT | Performed by: RADIOLOGY

## 2017-08-29 ENCOUNTER — APPOINTMENT (OUTPATIENT)
Dept: RADIATION ONCOLOGY | Facility: CLINIC | Age: 57
End: 2017-08-29
Payer: COMMERCIAL

## 2017-08-29 PROCEDURE — 77427 RADIATION TX MANAGEMENT X5: CPT | Performed by: RADIOLOGY

## 2017-08-29 PROCEDURE — 77014 ZZHC CT GUIDE FOR PLACEMENT RADIATION THERAPY FIELDS: CPT | Performed by: RADIOLOGY

## 2017-08-29 PROCEDURE — 77336 RADIATION PHYSICS CONSULT: CPT | Performed by: RADIOLOGY

## 2017-08-29 PROCEDURE — 77385 ZZC IMRT TREATMENT DELIVERY, SIMPLE: CPT | Performed by: RADIOLOGY

## 2017-09-22 DIAGNOSIS — R30.0 DYSURIA: Primary | ICD-10-CM

## 2017-09-22 RX ORDER — OXYBUTYNIN CHLORIDE 5 MG/1
5 TABLET ORAL 2 TIMES DAILY
Qty: 30 TABLET | Refills: 1 | Status: SHIPPED | OUTPATIENT
Start: 2017-09-22 | End: 2017-12-04

## 2017-10-25 ENCOUNTER — OFFICE VISIT (OUTPATIENT)
Dept: UROLOGY | Facility: OTHER | Age: 57
End: 2017-10-25
Payer: COMMERCIAL

## 2017-10-25 VITALS
RESPIRATION RATE: 16 BRPM | BODY MASS INDEX: 34.38 KG/M2 | SYSTOLIC BLOOD PRESSURE: 136 MMHG | WEIGHT: 219.5 LBS | DIASTOLIC BLOOD PRESSURE: 72 MMHG

## 2017-10-25 DIAGNOSIS — C61 PROSTATE CANCER (H): ICD-10-CM

## 2017-10-25 DIAGNOSIS — R35.0 URINARY FREQUENCY: Primary | ICD-10-CM

## 2017-10-25 PROCEDURE — 51798 US URINE CAPACITY MEASURE: CPT | Performed by: UROLOGY

## 2017-10-25 PROCEDURE — 99214 OFFICE O/P EST MOD 30 MIN: CPT | Mod: 25 | Performed by: UROLOGY

## 2017-10-25 RX ORDER — SILDENAFIL 100 MG/1
100 TABLET, FILM COATED ORAL DAILY PRN
Qty: 6 TABLET | Refills: 3 | Status: SHIPPED | OUTPATIENT
Start: 2017-10-25 | End: 2019-04-19

## 2017-10-25 RX ORDER — OXYBUTYNIN CHLORIDE 10 MG/1
10 TABLET, EXTENDED RELEASE ORAL DAILY
Qty: 90 TABLET | Refills: 1 | Status: SHIPPED | OUTPATIENT
Start: 2017-10-25 | End: 2019-04-19

## 2017-10-25 NOTE — PROGRESS NOTES
Bladder Scan performed. 28ML maximum residual urine detected after 3 scans. MD informed.    Leno Robles, CMA

## 2017-10-25 NOTE — MR AVS SNAPSHOT
After Visit Summary   10/25/2017    Estuardo Bowden    MRN: [id number]           Patient Information     Date Of Birth          1960        Visit Information        Provider Department      10/25/2017 8:45 AM Ross Rust MD [facility]        Today's Diagnoses     Urinary frequency    -  1    Prostate cancer (H)        Male impotence           Follow-ups after your visit        Your next 10 appointments already scheduled     Dec 04, 2017  9:30 AM CST   Return Visit with Shaun James MD   [facility] ([facility])    [address] [place] [address] [phone]              Who to contact     If you have questions or need follow up information about today's clinic visit or your schedule please contact [facility] directly at [phone].  Normal or non-critical lab and imaging results will be communicated to you by MyChart, letter or phone within 4 business days after the clinic has received the results. If you do not hear from us within 7 days, please contact the clinic through [employer] or phone. If you have a critical or abnormal lab result, we will notify you by phone as soon as possible.  Submit refill requests through [employer] or call your pharmacy and they will forward the refill request to us. Please allow 3 business days for your refill to be completed.          Additional Information About Your Visit        MyChart Information     [employer] gives you secure access to your electronic health record. If you see a primary care provider, you can also send messages to your care team and make appointments. If you have questions, please call your primary care clinic.  If you do not have a primary care provider, please call [phone] and they will assist you.        Care EveryWhere ID     This is your Care EveryWhere ID. This could be used by other organizations to access your [facility]  records  [id number]        Your Vitals Were     Respirations BMI (Body Mass Index)                16 34.38 kg/m2           Blood Pressure from Last 3 Encounters:   10/25/17 136/72   05/18/17 120/88   05/11/17 110/60    Weight from Last 3 Encounters:   10/25/17 219 lb 8 oz (99.6 kg)   08/24/17 217 lb (98.4 kg)   08/17/17 216 lb (98 kg)              We Performed the Following     MEASURE POST-VOID RESIDUAL URINE/BLADDER CAPACITY, US NON-IMAGING          Today's Medication Changes          These changes are accurate as of: 10/25/17  9:45 AM.  If you have any questions, ask your nurse or doctor.               Start taking these medicines.        Dose/Directions    sildenafil 100 MG tablet   Commonly known as:  VIAGRA   Used for:  Male impotence   Started by:  Ross Rust MD        Dose:  100 mg   Take 1 tablet (100 mg) by mouth daily as needed 30 min to 4 hrs before sex. Do not use with nitroglycerin, terazosin or doxazosin.   Quantity:  6 tablet   Refills:  3         These medicines have changed or have updated prescriptions.        Dose/Directions    * oxybutynin 5 MG tablet   Commonly known as:  DITROPAN   This may have changed:  Another medication with the same name was added. Make sure you understand how and when to take each.   Used for:  Dysuria   Changed by:  Ross Rust MD        Dose:  5 mg   Take 1 tablet (5 mg) by mouth 2 times daily   Quantity:  30 tablet   Refills:  1       * oxybutynin 10 MG 24 hr tablet   Commonly known as:  DITROPAN XL   This may have changed:  You were already taking a medication with the same name, and this prescription was added. Make sure you understand how and when to take each.   Used for:  Urinary frequency   Changed by:  Ross Rust MD        Dose:  10 mg   Take 1 tablet (10 mg) by mouth daily   Quantity:  90 tablet   Refills:  1       * Notice:  This list has 2 medication(s) that are the same as other medications prescribed for you. Read the directions  carefully, and ask your doctor or other care provider to review them with you.         Where to get your medicines      These medications were sent to [id number] - [place] - [address]  [address] [place] [address] [address]     Phone:  [phone]     oxybutynin 10 MG 24 hr tablet         Some of these will need a paper prescription and others can be bought over the counter.  Ask your nurse if you have questions.     Bring a paper prescription for each of these medications     sildenafil 100 MG tablet                Primary Care Provider Office Phone # Fax #    Hernandez Luna [phone] [address] [facility]  [place] [address]        Equal Access to Services     PARTHA VALADEZ : Hadii bob cross hadasho Soomaali, waaxda luqadaha, qaybta kaalmada adeegyada, lance davis . So [facility] [phone].    ATENCIÓN: Si habla español, tiene a sage disposición servicios gratuitos de asistencia lingüística. Llame al [phone].    We comply with applicable federal civil rights laws and [place] laws. We do not discriminate on the basis of race, color, national origin, age, disability, sex, sexual orientation, or gender identity.            Thank you!     Thank you for choosing [facility]  for your care. Our goal is always to provide you with excellent care. Hearing back from our patients is one way we can continue to improve our services. Please take a few minutes to complete the written survey that you may receive in the mail after your visit with us. Thank you!             Your Updated Medication List - Protect others around you: Learn how to safely use, store and throw away your medicines at www.disposemymeds.org.          This list is accurate as of: 10/25/17  9:45 AM.  Always use your most recent med list.                   Brand Name Dispense Instructions for use Diagnosis    IMODIUM A-D PO           leuprolide 45 MG kit    ELIGARD     Inject 45  mg Subcutaneous every 6 months        * oxybutynin 5 MG tablet    DITROPAN    30 tablet    Take 1 tablet (5 mg) by mouth 2 times daily    Dysuria       * oxybutynin 10 MG 24 hr tablet    DITROPAN XL    90 tablet    Take 1 tablet (10 mg) by mouth daily    Urinary frequency       sildenafil 100 MG tablet    VIAGRA    6 tablet    Take 1 tablet (100 mg) by mouth daily as needed 30 min to 4 hrs before sex. Do not use with nitroglycerin, terazosin or doxazosin.    Male impotence       * Notice:  This list has 2 medication(s) that are the same as other medications prescribed for you. Read the directions carefully, and ask your doctor or other care provider to review them with you.

## 2017-10-25 NOTE — PROGRESS NOTES
Chief Complaint   Patient presents with     RECHECK     urinary tract issues     Medication Problem     Oxybutinin is not very effective - patient reports problems controlling urination       Estuardo Bowden is a 57 year old male who presents today for follow up of   Chief Complaint   Patient presents with     RECHECK     urinary tract issues     Medication Problem     Oxybutinin is not very effective - patient reports problems controlling urination   f/u for prostate cancer s/p bnc dilation He has seen radiation oncology and finished salvage XRT.   He c/o urinary frequency/nocturia.  His urinary flow is ok.  He has no straining.  He also has ED, partial.      Current Outpatient Prescriptions   Medication Sig Dispense Refill     oxybutynin (DITROPAN XL) 10 MG 24 hr tablet Take 1 tablet (10 mg) by mouth daily 90 tablet 1     sildenafil (VIAGRA) 100 MG tablet Take 1 tablet (100 mg) by mouth daily as needed 30 min to 4 hrs before sex. Do not use with nitroglycerin, terazosin or doxazosin. 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     Prostate cancer (H) 05/07/2015     Past Surgical History:   Procedure Laterality Date     ARTHROTOMY SHOULDER, ROTATOR CUFF REPAIR, COMBINED Right 10/7/2016    Right MICHELERMartine Mumford     BIOPSY PROSTATE TRANSRECTAL  05/07/2015     BIOPSY PROSTATE TRANSRECTAL  07/06/2016     CYSTOSCOPY, DILATE URETHRA, COMBINED N/A 5/11/2017    Procedure: COMBINED CYSTOSCOPY, DILATE URETHRA;  Cystoscopy, ballon dilation;  Surgeon: Ross Rust MD;  Location: [facility]     PROSTATECTOMY RETROPUBIC RADICAL  12/19/2016     SIGMOIDOSCOPY FLEXIBLE  7/15/2013    Procedure:  SIGMOIDOSCOPY FLEXIBLE;  Sigmoidoscopy Flexible;  Surgeon: Dusty Chang MD;  Location: [facility]     Family History   Problem Relation Age of Onset     DIABETES Maternal Grandmother      Hypertension Mother      Prostate Cancer Maternal Grandfather      C.A.D. No family hx of      Coronary Artery Disease No family hx of      Breast Cancer No family hx of      Ovarian Cancer No family hx of      Mental Illness No family hx of      Asthma No family hx of      Obesity No family hx of      Unknown/Adopted No family hx of      Anesthesia Reaction No family hx of      CEREBROVASCULAR DISEASE No family hx of      Other Cancer No family hx of      Cancer - colorectal No family hx of      Hyperlipidemia No family hx of      Known Genetic Syndrome No family hx of      OSTEOPOROSIS No family hx of      Depression No family hx of      Anxiety Disorder No family hx of      MENTAL ILLNESS No family hx of      Substance Abuse No family hx of      Thyroid Disease No family hx of      Genetic Disorder No family hx of      Colon Cancer No family hx of      Social History     Social History     Marital status:      Spouse name: N/A     Number of children: 2     Years of education: N/A     Occupational History      [employer] [place]     Social History Main Topics     Smoking status: Never Smoker     Smokeless tobacco: Current User     Types: Chew      Comment: 1 tin every 3 days     Alcohol use 7.2 oz/week     12 Cans of beer, 0 Standard drinks or equivalent per week      Comment: 12 beers per week     Drug use: No     Sexual activity: Yes     Partners: Female     Birth control/ protection: None     Other Topics Concern     Parent/Sibling W/ Cabg, Mi Or Angioplasty Before 65f 55m? No      Service No     Blood Transfusions No     Caffeine Concern No     Occupational Exposure No     Hobby Hazards No     Sleep Concern No     Stress Concern No     Weight Concern No     Special Diet No     Back Care No      Exercise Yes     [employer] 12+ times per month     Seat Belt Yes     Self-Exams Yes     Social History Narrative       REVIEW OF SYSTEMS  =================  C: NEGATIVE for fever, chills, change in weight  I: NEGATIVE for worrisome rashes, moles or lesions  E/M: NEGATIVE for ear, mouth and throat problems  R: NEGATIVE for significant cough or SHORTNESS OF BREATH,   CV: NEGATIVE for chest pain, palpitations or peripheral edema  GI: NEGATIVE for nausea, abdominal pain, heartburn, or change in bowel habits  NEURO: NEGATIVE any motor/sensory changes  PSYCH: NEGATIVE for recent mood disorder    Physical Exam:  /72 (BP Location: Right arm, Patient Position: Chair, Cuff Size: Adult Large)  Resp 16  Wt 219 lb 8 oz (99.6 kg)  BMI 34.38 kg/m2   Patient is pleasant, in no acute distress, good general condition.  Lung: no evidence of respiratory distress    Abdomen: Soft, nondistended, non tender. No masses. No rebound or guarding.   Exam: no cva tenderness.  Bladder scan minimal residual urine  Skin: Warm and dry.  No redness.  Psych: normal mood and affect  Neuro: alert and oriented    Assessment/Plan:   (C61) Prostate cancer (H)  (primary encounter diagnosis)  Comment:    Plan:  S/p RRP and XRT for group 4 prostate cancer            rtc in 3 months for eligard shot  Frequency:  Comment: s/p dilation  Plan:  Ditropan prn    ED:  viagra prescribed.  Instructions given.

## 2017-10-25 NOTE — NURSING NOTE
"Chief Complaint   Patient presents with     RECHECK     urinary tract issues     Medication Problem     Oxybutinin is not very effective - patient reports problems controlling urination       Initial /72 (BP Location: Right arm, Patient Position: Chair, Cuff Size: Adult Large)  Resp 16  Wt 219 lb 8 oz (99.6 kg)  BMI 34.38 kg/m2 Estimated body mass index is 34.38 kg/(m^2) as calculated from the following:    Height as of 5/10/17: 5' 7\" (1.702 m).    Weight as of this encounter: 219 lb 8 oz (99.6 kg).  Medication Reconciliation: complete     Leno Robles CMA      "

## 2017-11-29 DIAGNOSIS — C61 MALIGNANT NEOPLASM OF PROSTATE (H): ICD-10-CM

## 2017-11-29 LAB — PSA SERPL-MCNC: <0.01 UG/L (ref 0–4)

## 2017-11-29 PROCEDURE — 36415 COLL VENOUS BLD VENIPUNCTURE: CPT | Performed by: RADIOLOGY

## 2017-11-29 PROCEDURE — 84153 ASSAY OF PSA TOTAL: CPT | Performed by: RADIOLOGY

## 2017-12-04 ENCOUNTER — OFFICE VISIT (OUTPATIENT)
Dept: RADIATION ONCOLOGY | Facility: CLINIC | Age: 57
End: 2017-12-04
Payer: COMMERCIAL

## 2017-12-04 VITALS
BODY MASS INDEX: 33.69 KG/M2 | OXYGEN SATURATION: 98 % | HEART RATE: 75 BPM | WEIGHT: 215.1 LBS | SYSTOLIC BLOOD PRESSURE: 141 MMHG | RESPIRATION RATE: 14 BRPM | DIASTOLIC BLOOD PRESSURE: 69 MMHG

## 2017-12-04 DIAGNOSIS — C61 MALIGNANT NEOPLASM OF PROSTATE (H): Primary | ICD-10-CM

## 2017-12-04 PROCEDURE — 99213 OFFICE O/P EST LOW 20 MIN: CPT | Performed by: RADIOLOGY

## 2017-12-04 RX ORDER — ACETAMINOPHEN 500 MG
1 TABLET ORAL DAILY
Qty: 30 TABLET
Start: 2017-12-04

## 2017-12-04 ASSESSMENT — PAIN SCALES - GENERAL: PAINLEVEL: NO PAIN (0)

## 2017-12-04 NOTE — PATIENT INSTRUCTIONS
Follow up with Dr James on 6/5/18 at 9 am with a PSA prior    Please contact [facility] RN with questions or concerns following today's appointment: [phone].    Thank you!

## 2017-12-04 NOTE — NURSING NOTE
FOLLOW-UP VISIT    Patient Name: Estuardo Bowden      : 1960     Age: 57 year old        ______________________________________________________________________________     Chief Complaint   Patient presents with     RECHECK     prostate follow up with PSA     /69 (BP Location: Left arm, Patient Position: Sitting, Cuff Size: Adult Large)  Pulse 75  Resp 14  Wt 215 lb 1.6 oz  SpO2 98%  BMI 33.69 kg/m2     Date Radiation Completed: 17 Prostate    Pain  Denies    Labs  Other Labs: Yes: PSA    Imaging  None        PSA:   PSA   Date Value Ref Range Status   2017 <0.01 0 - 4 ug/L Final     Comment:     Assay Method:  Chemiluminescence using Siemens Vista analyzer   2017 0.28 0 - 4 ug/L Final     Comment:     Assay Method:  Chemiluminescence using Siemens Vista analyzer   10/27/2016 20.90 (H) 0 - 4 ug/L Final     Comment:     Assay Method:  Chemiluminescence using Siemens Vista analyzer   06/15/2016 15.69 (H) 0 - 4 ug/L Final   2016 16.62 (H) 0 - 4 ug/L Final   2015 11.40 (H) 0 - 4 ug/L Final   2015 11.23 (H) 0 - 4 ug/L Final   2010  0 - 4 ug/L Corrected    Incorrectly ordered by PCU/Clinic   Test reordered under corrected code.  CORRECTED ON  AT 1703: PREVIOUSLY REPORTED AS 4.29   2010 4.29 (H) 0 - 4 ug/L Final     Testosterone Level: No results found for: TESTOSTTOTAL    On-Study AUA Symptom Score (PQ)  AUA (American Urological Association) Symptom Score  1. Over the past month, how often have you had a sensation of not emptying your bladder completely after you finished urinating?: Almost always  2. Over the past month, how often have you had to urinate again less than two hours after you finished urinating?: Almost always  3. Over the past month, how often have you found you stopped and started again several times when you urinated?: Less than half the time  4. Over the past month, how often have you found it difficult to postpone urination?:  Almost always  5. Over the past month, how often have you had a weak urine stream?: More than half the time  6. Over the past month, how often have you had to push or strain to begin urinating?: More than half the time  7. Over the past month, how many times did you typically get up to urinate from the time you went to bed at night until the time you got up in the morning?: 3 times  The Disease-specific Quality of Life Question: If you were to spend the rest of your life with your urinary condition just the way it is now, how would you feel about that? (highlight or underline): Unhappy  AUA Score: 28    Diarrhea: 0- None    Constipation: 0- None        Other Appointments:     MD Name:  Appointment Date:      MD Name: Appointment Date:   MD Name: Appointment Date:   Other Appointment Notes:     Residual Radiation side effect: patient reports no side effects from radiation      Additional Instructions:     Nurse face-to-face time: Level 2:  5 min face to face time    Terry FLOR

## 2017-12-04 NOTE — PROGRESS NOTES
RADIATION ONCOLOGY FOLLOW-UP NOTE    Date of Visit: Dec 4, 2017  Patient Name: Estuardo Bowden  MRN: [id number]  : 1960    DIAGNOSIS: prostate cancer s/p prostatectomy, GS 4+4, pT3bN0, neg margins, PSA 0.28 postop (iPSA 20.9)    TREATMENT PLAN: Salvage RT to 70 Gy with long term ADT    INTERVAL SINCE COMPLETION OF THERAPY: 3 months since 2017    NARRATIVE: He returns for follow-up. He had a PSA on  that was undetectable at less than 0.01. He has also seen Dr. Rust in follow-up, and who discussed ditropan and viagra as needed, and scheduled another Eligard injection in 2 months. He has been doing well since completion of radiation therapy and has noted no change in his bowel movements and no blood in his stool. He does continue to have significant urinary symptoms with an AUA score of 28 with primary complaints of frequency, urgency, and nocturia ×3. His sexual health inventory score is 5/25. His energy level has recovered a little bit, and he is working full-time. He remains on Eligard and is tolerating it well with mild hot flashes occasionally.    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     Prostate cancer (H) 2015      Past Surgical History:   Procedure Laterality Date     ARTHROTOMY SHOULDER, ROTATOR CUFF REPAIR, COMBINED Right 10/7/2016    Right RCRMratine Mumford     BIOPSY PROSTATE TRANSRECTAL  2015     BIOPSY PROSTATE TRANSRECTAL  2016     CYSTOSCOPY, DILATE URETHRA, COMBINED N/A 2017    Procedure: COMBINED CYSTOSCOPY, DILATE URETHRA;  Cystoscopy, ballon dilation;  Surgeon: Ross Rust MD;  Location: [facility]     PROSTATECTOMY RETROPUBIC RADICAL  2016     SIGMOIDOSCOPY FLEXIBLE  7/15/2013    Procedure: SIGMOIDOSCOPY FLEXIBLE;  Sigmoidoscopy Flexible;  Surgeon: Dusty Chang MD;  Location:  GI       ALLERGIES:  No Known  Allergies    MEDICATIONS:   Current Outpatient Prescriptions   Medication Sig Dispense Refill     oxybutynin (DITROPAN XL) 10 MG 24 hr tablet Take 1 tablet (10 mg) by mouth daily 90 tablet 1     sildenafil (VIAGRA) 100 MG tablet Take 1 tablet (100 mg) by mouth daily as needed 30 min to 4 hrs before sex. Do not use with nitroglycerin, terazosin or doxazosin. 6 tablet 3     Loperamide HCl (IMODIUM A-D PO)        leuprolide (ELIGARD) 45 MG kit Inject 45 mg Subcutaneous every 6 months         REVIEW OF SYSTEMS: A 10-point review of systems was obtained. Pertinent findings are noted in the HPI and are otherwise unremarkable.     PHYSICAL EXAM:  VITALS: /69 (BP Location: Left arm, Patient Position: Sitting, Cuff Size: Adult Large)  Pulse 75  Resp 14  Wt 215 lb 1.6 oz  SpO2 98%  BMI 33.69 kg/m2  GEN: appears well, in no acute distress  HEENT: normocephalic and atraumatic, EOMI, anicteric sclerae  CV: no LE edema, no JVD  RESP: normal respiration on room air, no stridor  ABDOMEN: soft, NT, ND   rectal: Deferred in light of controlled PSA  SKIN: normal color and turgor  MSK: moving all extremities well  NEURO: CN II-XII grossly intact, no focal neurologic deficit  PSYCH: appropriate mood, affect, and judgment    All pertinent laboratory, imaging, and pathology findings have been reviewed.     IMPRESSION/RECOMMENDATION:  57-year-old man with high risk prostate cancer status post prostatectomy and salvage radiation. He is doing well with no significant radiation toxicity and no biochemical evidence of disease.  I recommended that he start taking calcium and vitamin D supplementation while on hormonal therapy. He does have significant urinary symptoms which were present at baseline, and is taking oxybutynin for this. I told him that if he has no benefit to call us or his urologist and we could try something else such as detrol. He will continue on hormonal therapy and follow up with Dr. Rust. He will return for  follow up in 6 months with a repeat PSA and was instructed to call our clinic with any questions or concerns.    Shaun Jmaes M.D.  Attending Physician  Radiation Oncology  Clinic phone #: [phone]  Pager #: [phone]

## 2017-12-04 NOTE — MR AVS SNAPSHOT
After Visit Summary   12/4/2017    Estuardo Bowden    MRN: [id number]           Patient Information     Date Of Birth          1960        Visit Information        Provider Department      12/4/2017 9:30 AM Shaun James MD [facility]        Today's Diagnoses     Malignant neoplasm of prostate (H)    -  1      Care Instructions    Follow up with Dr James on 6/5/18 at 9 am with a PSA prior    Please contact [facility] RN with questions or concerns following today's appointment: [phone].    Thank you!            Follow-ups after your visit        Your next 10 appointments already scheduled     Jun 05, 2018  9:00 AM CDT   Return Visit with Shaun James MD   [facility]    [address] [place] [id number] [phone]              Who to contact     If you have questions or need follow up information about today's clinic visit or your schedule please contact [facility] directly at [phone].  Normal or non-critical lab and imaging results will be communicated to you by "[employer]hart, letter or phone within 4 business days after the clinic has received the results. If you do not hear from us within 7 days, please contact the clinic through [employer] or phone. If you have a critical or abnormal lab result, we will notify you by phone as soon as possible.  Submit refill requests through [email or web address] or call your pharmacy and they will forward the refill request to us. Please allow 3 business days for your refill to be completed.          Additional Information About Your Visit        "[employer]hart Information     [email or web address] gives you secure access to your electronic health record. If you see a primary care provider, you can also send messages to your care team and make appointments. If you have questions, please call your primary care clinic.  If you do not have a primary care  provider, please call [phone] and they will assist you.      eSNF is an electronic gateway that provides easy, online access to your medical records. With eSNF, you can request a clinic appointment, read your test results, renew a prescription or communicate with your care team.     To access your existing account, please contact your [facility] or call [phone] for assistance.        Care EveryWhere ID     This is your Care EveryWhere ID. This could be used by other organizations to access your [place] medical records  [id number]        Your Vitals Were     Pulse Respirations Pulse Oximetry BMI (Body Mass Index)          75 14 98% 33.69 kg/m2         Blood Pressure from Last 3 Encounters:   12/04/17 141/69   10/25/17 136/72   05/18/17 120/88    Weight from Last 3 Encounters:   12/04/17 215 lb 1.6 oz   10/25/17 219 lb 8 oz   08/24/17 217 lb              Today, you had the following     No orders found for display       Primary Care Provider Office Phone # Fax #    Hernandez Luna [phone] [address] [facility] [address]        Equal Access to Services     [facility] : Hadii aad ku hadasho Soomaali, waaxda luqadaha, qaybta kaalmada adeegyada, lance hatch hayjimmien aureliano fish. So [facility] [phone].    ATENCIÓN: Si habla español, tiene a sage disposición servicios gratuitos de asistencia lingüística. Llame al [phone].    We comply with applicable federal civil rights laws and [place] laws. We do not discriminate on the basis of race, color, national origin, age, disability, sex, sexual orientation, or gender identity.            Thank you!     Thank you for choosing [facility]  for your care. Our goal is always to provide you with excellent care. Hearing back from our patients is one way we can continue to improve our services. Please take a few minutes to complete the written survey that you may  receive in the mail after your visit with us. Thank you!             Your Updated Medication List - Protect others around you: Learn how to safely use, store and throw away your medicines at www.disposemymeds.org.          This list is accurate as of: 12/4/17 10:00 AM.  Always use your most recent med list.                   Brand Name Dispense Instructions for use Diagnosis    IMODIUM A-D PO           leuprolide 45 MG kit    ELIGARD     Inject 45 mg Subcutaneous every 6 months        oxybutynin 10 MG 24 hr tablet    DITROPAN XL    90 tablet    Take 1 tablet (10 mg) by mouth daily    Urinary frequency       sildenafil 100 MG tablet    VIAGRA    6 tablet    Take 1 tablet (100 mg) by mouth daily as needed 30 min to 4 hrs before sex. Do not use with nitroglycerin, terazosin or doxazosin.    Male impotence

## 2017-12-18 ENCOUNTER — OFFICE VISIT (OUTPATIENT)
Dept: FAMILY MEDICINE | Facility: CLINIC | Age: 57
End: 2017-12-18
Payer: COMMERCIAL

## 2017-12-18 ENCOUNTER — RADIANT APPOINTMENT (OUTPATIENT)
Dept: GENERAL RADIOLOGY | Facility: CLINIC | Age: 57
End: 2017-12-18
Attending: NURSE PRACTITIONER
Payer: COMMERCIAL

## 2017-12-18 VITALS
HEART RATE: 72 BPM | DIASTOLIC BLOOD PRESSURE: 76 MMHG | WEIGHT: 215.6 LBS | RESPIRATION RATE: 16 BRPM | SYSTOLIC BLOOD PRESSURE: 138 MMHG | TEMPERATURE: 98.6 F | BODY MASS INDEX: 33.77 KG/M2

## 2017-12-18 DIAGNOSIS — M79.662 PAIN OF LEFT LOWER LEG: ICD-10-CM

## 2017-12-18 DIAGNOSIS — R06.09 DOE (DYSPNEA ON EXERTION): ICD-10-CM

## 2017-12-18 DIAGNOSIS — M79.662 PAIN OF LEFT LOWER LEG: Primary | ICD-10-CM

## 2017-12-18 LAB
BASOPHILS # BLD AUTO: 0 10E9/L (ref 0–0.2)
BASOPHILS NFR BLD AUTO: 0.5 %
DIFFERENTIAL METHOD BLD: ABNORMAL
EOSINOPHIL # BLD AUTO: 0.3 10E9/L (ref 0–0.7)
EOSINOPHIL NFR BLD AUTO: 4.8 %
ERYTHROCYTE [DISTWIDTH] IN BLOOD BY AUTOMATED COUNT: 12.4 % (ref 10–15)
HCT VFR BLD AUTO: 40.3 % (ref 40–53)
HGB BLD-MCNC: 14 G/DL (ref 13.3–17.7)
LYMPHOCYTES # BLD AUTO: 1.6 10E9/L (ref 0.8–5.3)
LYMPHOCYTES NFR BLD AUTO: 26.2 %
MCH RBC QN AUTO: 34.5 PG (ref 26.5–33)
MCHC RBC AUTO-ENTMCNC: 34.7 G/DL (ref 31.5–36.5)
MCV RBC AUTO: 99 FL (ref 78–100)
MONOCYTES # BLD AUTO: 1 10E9/L (ref 0–1.3)
MONOCYTES NFR BLD AUTO: 16 %
NEUTROPHILS # BLD AUTO: 3.3 10E9/L (ref 1.6–8.3)
NEUTROPHILS NFR BLD AUTO: 52.5 %
PLATELET # BLD AUTO: 187 10E9/L (ref 150–450)
RBC # BLD AUTO: 4.06 10E12/L (ref 4.4–5.9)
WBC # BLD AUTO: 6.2 10E9/L (ref 4–11)

## 2017-12-18 PROCEDURE — 36415 COLL VENOUS BLD VENIPUNCTURE: CPT | Performed by: NURSE PRACTITIONER

## 2017-12-18 PROCEDURE — 85379 FIBRIN DEGRADATION QUANT: CPT | Performed by: NURSE PRACTITIONER

## 2017-12-18 PROCEDURE — 85025 COMPLETE CBC W/AUTO DIFF WBC: CPT | Performed by: NURSE PRACTITIONER

## 2017-12-18 PROCEDURE — 99214 OFFICE O/P EST MOD 30 MIN: CPT | Performed by: NURSE PRACTITIONER

## 2017-12-18 PROCEDURE — 71020 XR CHEST 2 VW: CPT

## 2017-12-18 PROCEDURE — 80048 BASIC METABOLIC PNL TOTAL CA: CPT | Performed by: NURSE PRACTITIONER

## 2017-12-18 NOTE — NURSING NOTE
"Chief Complaint   Patient presents with     Pain       Initial /76 (BP Location: Left arm, Patient Position: Chair, Cuff Size: Adult Large)  Pulse 72  Temp 98.6  F (37  C) (Oral)  Resp 16  Wt 215 lb 9.6 oz (97.8 kg)  BMI 33.77 kg/m2 Estimated body mass index is 33.77 kg/(m^2) as calculated from the following:    Height as of 5/10/17: 5' 7\" (1.702 m).    Weight as of this encounter: 215 lb 9.6 oz (97.8 kg).  Medication Reconciliation: complete  "

## 2017-12-18 NOTE — PROGRESS NOTES
SUBJECTIVE:                                                    Estuardo Bowden is a 57 year old male who presents to clinic today for the following health issues:      HPI    Joint Pain    Onset: 7 days     Description:   Location: left leg, back of knee   Character: sharp    Monday felt a pinch behind the knee when getting up from his desk, later in the week this progressed to a tender/slightly swollen calf and pain in the L heel as well.     Intensity: moderate    Progression of Symptoms: worse, same    Accompanying Signs & Symptoms:  Other symptoms: radiation of pain to shin and heel     History:   Previous similar pain: no       Precipitating factors:   Trauma or overuse: no     Alleviating factors:  Improved by: nothing    Therapies Tried and outcome: ibuprofen     Patient reports for joint pain that started 5 days ago in his left leg and back of his knee. He is experiencing a sharp pain. On Monday, he got up from his desk, and then he felt a pinch behind the knee. On Tuesday his pain was okay. On Wednesday, he felt he had a Harry horse. This past weekend his symptoms worsened. This morning his symptoms were unbearable. Patient has been scratching the dry skin on his legs. He tries to do sit ups and push ups for exercise. He has not done those exercises this week. He relates having some shortness of breath with hauling boxes at home yesterday. He does not think he pulled a muscle in his leg. Patient denies chest pain.       He has done chemotherapy and radiation for his prostate cancer. On hormone supressant injection for cancer Q 6 months.       Problem list and histories reviewed & adjusted, as indicated.  Additional history: as documented        Patient Active Problem List   Diagnosis     CARDIOVASCULAR SCREENING; LDL GOAL LESS THAN 130     Tobacco use disorder     ED (erectile dysfunction)     Prostate cancer (H)     Biceps tendonitis, right     AC (acromioclavicular) joint arthritis      Complete tear of right rotator cuff     S/P complete repair of rotator cuff     Past Surgical History:   Procedure Laterality Date     ARTHROTOMY SHOULDER, ROTATOR CUFF REPAIR, COMBINED Right 10/7/2016    Right RCR, Russell Farley     BIOPSY PROSTATE TRANSRECTAL  05/07/2015     BIOPSY PROSTATE TRANSRECTAL  07/06/2016     CYSTOSCOPY, DILATE URETHRA, COMBINED N/A 5/11/2017    Procedure: COMBINED CYSTOSCOPY, DILATE URETHRA;  Cystoscopy, ballon dilation;  Surgeon: Ross Rust MD;  Location: [facility] OR     PROSTATECTOMY RETROPUBIC RADICAL  12/19/2016     SIGMOIDOSCOPY FLEXIBLE  7/15/2013    Procedure: SIGMOIDOSCOPY FLEXIBLE;  Sigmoidoscopy Flexible;  Surgeon: Dusty Chang MD;  Location:  [facility]       Social History   Substance Use Topics     Smoking status: Never Smoker     Smokeless tobacco: Current User     Types: Chew      Comment: 1 tin every 3 days     Alcohol use 7.2 oz/week     12 Cans of beer, 0 Standard drinks or equivalent per week      Comment: 12 beers per week     Family History   Problem Relation Age of Onset     DIABETES Maternal Grandmother      Hypertension Mother      Prostate Cancer Maternal Grandfather      C.A.D. No family hx of      Coronary Artery Disease No family hx of      Breast Cancer No family hx of      Ovarian Cancer No family hx of      Mental Illness No family hx of      Asthma No family hx of      Obesity No family hx of      Unknown/Adopted No family hx of      Anesthesia Reaction No family hx of      CEREBROVASCULAR DISEASE No family hx of      Other Cancer No family hx of      Cancer - colorectal No family hx of      Hyperlipidemia No family hx of      Known Genetic Syndrome No family hx of      OSTEOPOROSIS No family hx of      Depression No family hx of      Anxiety Disorder No family hx of      MENTAL ILLNESS No family hx of      Substance Abuse No family hx of      Thyroid Disease No family hx of      Genetic Disorder No family hx of      Colon Cancer No family hx of           Current Outpatient Prescriptions   Medication Sig Dispense Refill     Calcium Carbonate-Vit D-Min (CALCIUM 1200) 8761-4723 MG-UNIT CHEW Take 1 tablet by mouth daily 30 tablet      oxybutynin (DITROPAN XL) 10 MG 24 hr tablet Take 1 tablet (10 mg) by mouth daily 90 tablet 1     sildenafil (VIAGRA) 100 MG tablet Take 1 tablet (100 mg) by mouth daily as needed 30 min to 4 hrs before sex. Do not use with nitroglycerin, terazosin or doxazosin. 6 tablet 3     Loperamide HCl (IMODIUM A-D PO)        leuprolide (ELIGARD) 45 MG kit Inject 45 mg Subcutaneous every 6 months       No Known Allergies      ROS:  Constitutional, neuro, ENT, endocrine, pulmonary, cardiac, gastrointestinal, genitourinary, musculoskeletal, integument and psychiatric systems are negative, except as otherwise noted.    This document serves as a record of the services and decisions personally performed and made by Leslie Cali DNP. It was created on her behalf by Brissa Diaz, a trained medical scribe. The creation of this document is based on the provider's statements to the medical scribe.  Brissa Diaz 4:38 PM December 18, 2017    OBJECTIVE:                                                    /76 (BP Location: Left arm, Patient Position: Chair, Cuff Size: Adult Large)  Pulse 72  Temp 98.6  F (37  C) (Oral)  Resp 16  Wt 215 lb 9.6 oz (97.8 kg)  BMI 33.77 kg/m2  Body mass index is 33.77 kg/(m^2).  GENERAL APPEARANCE: healthy, alert and no distress  Cardiovascular: negative, PMI normal. No lifts, heaves, or thrills. RRR. No murmurs, clicks gallops or rub  Respiratory: negative, Percussion normal. Good diaphragmatic excursion. Lungs clear  Neck: Neck supple. No adenopathy. Thyroid symmetric, normal size, Carotids without bruits.  ENT: ENT exam normal, no neck nodes or sinus tenderness  MS: left leg: taught calf muscle and tender at base of mid third of calf, negative tien's sign, otherwise extremities normal- no gross deformities noted  right largest circumference on right leg is 42 cm, left leg, just under 43 cm.   SKIN: no suspicious lesions or rashes. No erythema Mild dryness and excoriation with mild scabbing to post. Left leg > right leg- no redness or drainage from regions.   NEURO: Normal strength and tone, mentation intact and speech normal  PSYCH: mentation appears normal and affect normal/bright    Diagnostic test results:  Diagnostic Test Results:  Results for orders placed or performed in visit on 12/18/17 (from the past 24 hour(s))   CBC with platelets and differential   Result Value Ref Range    WBC 6.2 4.0 - 11.0 10e9/L    RBC Count 4.06 (L) 4.4 - 5.9 10e12/L    Hemoglobin 14.0 13.3 - 17.7 g/dL    Hematocrit 40.3 40.0 - 53.0 %    MCV 99 78 - 100 fl    MCH 34.5 (H) 26.5 - 33.0 pg    MCHC 34.7 31.5 - 36.5 g/dL    RDW 12.4 10.0 - 15.0 %    Platelet Count 187 150 - 450 10e9/L    Diff Method Automated Method     % Neutrophils 52.5 %    % Lymphocytes 26.2 %    % Monocytes 16.0 %    % Eosinophils 4.8 %    % Basophils 0.5 %    Absolute Neutrophil 3.3 1.6 - 8.3 10e9/L    Absolute Lymphocytes 1.6 0.8 - 5.3 10e9/L    Absolute Monocytes 1.0 0.0 - 1.3 10e9/L    Absolute Eosinophils 0.3 0.0 - 0.7 10e9/L    Absolute Basophils 0.0 0.0 - 0.2 10e9/L          ASSESSMENT/PLAN:                                                        ICD-10-CM    1. Pain of left lower leg M79.662 XR Chest 2 Views     CBC with platelets and differential     Basic metabolic panel     D dimer, quantitative     US Lower Extremity Venous Duplex Left   2. TRACEY (dyspnea on exertion) R06.09 XR Chest 2 Views     US Lower Extremity Venous Duplex Left     Dobutamine Stress Echocardiogram     Evaluated left lower leg and TRACEY symptoms. Will complete a chest x-ray, and notify with results. Ordered an ultrasound. Order placed for labs that the patient will complete today. Will notify with results. If blood work is elevated will treat for infection. Pending results, will determine further  treatment.   Discussed differentials, myalgia muscle strain, spontaneously ruptured popliteal cyst, DVT, infection vs other.     Discussed risks of outpt. Imaging including PE and death- pt. Is not wanting ED referral and choosing outpt. Eval. Needs dobutamine as current left leg issue.     Will order stress test for dyspnea.  Has fairly sedentary lifestyle, obese, and microvascular disease- moderate risk for CV disease.     Follow up with Provider - Return to clinic if symptoms worsen or fail to improve.     All questions invited, asked and answered to the patient's apparent satisfaction.  Patient agrees to plan.        12/19/17- noted DVT on left leg- sent to ED for chest eval due to TRACEY. CC note to urology.       The information in this document, created by the medical scribe for me, accurately reflects the services I personally performed and the decisions made by me. I have reviewed and approved this document for accuracy prior to leaving the patient care area.  December 18, 2017 4:49 PM    ADORE Duran [facility]

## 2017-12-18 NOTE — Clinical Note
HI Dr. Rust,  This pt. Is positive for DVT. I'm not sure if the Leuprolide is considered a provoking factor or not? Can you take a look and advise accordingly? He is at [facility] for eval for the TRACEY currently/today.  Thanks,  Leslie Cali

## 2017-12-18 NOTE — MR AVS SNAPSHOT
After Visit Summary   12/18/2017    Estuardo Bowden    MRN: [id number]           Patient Information     Date Of Birth          1960        Visit Information        Provider Department      12/18/2017 3:40 PM Leslie Cali APRN CNP [facility]        Today's Diagnoses     Pain of left lower leg    -  1    TRACEY (dyspnea on exertion)          Care Instructions    Complete ultrasound.    If blood work is elevated will treat for infection.           Follow-ups after your visit        Follow-up notes from your care team     Return if symptoms worsen or fail to improve.      Your next 10 appointments already scheduled     Dec 19, 2017  9:00 AM CST   US LOWER EXTREMITY VENOUS DUPLEX LEFT with ERUS1   [facility]    [address] [address] [phone]           Please bring a list of your medicines (including vitamins, minerals and over-the-counter drugs). Also, tell your doctor about any allergies you may have. Wear comfortable clothes and leave your valuables at home.  You do not need to do anything special to prepare for your exam.  Please call the Imaging Department at your exam site with any questions.            Jun 05, 2018  9:00 AM CDT   Return Visit with Shaun James MD   [facility]    [address] [place] [address] [phone]              Future tests that were ordered for you today     Open Future Orders        Priority Expected Expires Ordered    Dobutamine Stress Echocardiogram Routine  12/19/2018 12/19/2017            Who to contact     If you have questions or need follow up information about today's clinic visit or your schedule please contact [facility] directly at [phone].  Normal or non-critical lab and imaging results will be communicated to you by MyChart, letter or phone within 4 business days after the clinic  has received the results. If you do not hear from us within 7 days, please contact the clinic through [employer] or phone. If you have a critical or abnormal lab result, we will notify you by phone as soon as possible.  Submit refill requests through [employer] or call your pharmacy and they will forward the refill request to us. Please allow 3 business days for your refill to be completed.          Additional Information About Your Visit        [employer] Information     [employer] gives you secure access to your electronic health record. If you see a primary care provider, you can also send messages to your care team and make appointments. If you have questions, please call your primary care clinic.  If you do not have a primary care provider, please call [phone] and they will assist you.        Care EveryWhere ID     This is your Care EveryWhere ID. This could be used by other organizations to access your [facility] medical records  [id number]        Your Vitals Were     Pulse Temperature Respirations BMI (Body Mass Index)          72 98.6  F (37  C) (Oral) 16 33.77 kg/m2         Blood Pressure from Last 3 Encounters:   12/18/17 138/76   12/04/17 141/69   10/25/17 136/72    Weight from Last 3 Encounters:   12/18/17 215 lb 9.6 oz (97.8 kg)   12/04/17 215 lb 1.6 oz (97.6 kg)   10/25/17 219 lb 8 oz (99.6 kg)              We Performed the Following     Basic metabolic panel     CBC with platelets and differential     D dimer, quantitative        Primary Care Provider Office Phone # Fax #    Hernandez Elvin Luna [phone] [address] [facility] [address]        Equal Access to Services     [facility] : Hadii aad ku hadasho Soomaali, waaxda luqadaha, qaybta kaalmada ric, lance fish. So [facility] [phone].    ATENCIÓN: Si habla español, tiene a sage disposición servicios gratuitos de asistencia lingüística. Llame al [phone].    We comply with applicable  federal civil rights laws and [place] laws. We do not discriminate on the basis of race, color, national origin, age, disability, sex, sexual orientation, or gender identity.            Thank you!     Thank you for choosing [facility]  for your care. Our goal is always to provide you with excellent care. Hearing back from our patients is one way we can continue to improve our services. Please take a few minutes to complete the written survey that you may receive in the mail after your visit with us. Thank you!             Your Updated Medication List - Protect others around you: Learn how to safely use, store and throw away your medicines at www.disposemymeds.org.          This list is accurate as of: 12/18/17 11:59 PM.  Always use your most recent med list.                   Brand Name Dispense Instructions for use Diagnosis    calcium 1200 6985-5679 MG-UNIT Chew     30 tablet    Take 1 tablet by mouth daily    Malignant neoplasm of prostate (H)       IMODIUM A-D PO           leuprolide 45 MG kit    ELIGARD     Inject 45 mg Subcutaneous every 6 months        oxybutynin 10 MG 24 hr tablet    DITROPAN XL    90 tablet    Take 1 tablet (10 mg) by mouth daily    Urinary frequency       sildenafil 100 MG tablet    VIAGRA    6 tablet    Take 1 tablet (100 mg) by mouth daily as needed 30 min to 4 hrs before sex. Do not use with nitroglycerin, terazosin or doxazosin.    Male impotence

## 2017-12-19 ENCOUNTER — TELEPHONE (OUTPATIENT)
Dept: FAMILY MEDICINE | Facility: CLINIC | Age: 57
End: 2017-12-19

## 2017-12-19 ENCOUNTER — RADIANT APPOINTMENT (OUTPATIENT)
Dept: ULTRASOUND IMAGING | Facility: OTHER | Age: 57
End: 2017-12-19
Attending: NURSE PRACTITIONER
Payer: COMMERCIAL

## 2017-12-19 DIAGNOSIS — I82.4Z2 ACUTE DEEP VEIN THROMBOSIS (DVT) OF DISTAL VEIN OF LEFT LOWER EXTREMITY (H): Primary | ICD-10-CM

## 2017-12-19 DIAGNOSIS — M79.662 PAIN OF LEFT LOWER LEG: ICD-10-CM

## 2017-12-19 DIAGNOSIS — R06.09 DOE (DYSPNEA ON EXERTION): ICD-10-CM

## 2017-12-19 LAB
ANION GAP SERPL CALCULATED.3IONS-SCNC: 7 MMOL/L (ref 3–14)
BUN SERPL-MCNC: 11 MG/DL (ref 7–30)
CALCIUM SERPL-MCNC: 9.2 MG/DL (ref 8.5–10.1)
CHLORIDE SERPL-SCNC: 110 MMOL/L (ref 94–109)
CO2 SERPL-SCNC: 26 MMOL/L (ref 20–32)
CREAT SERPL-MCNC: 1.04 MG/DL (ref 0.66–1.25)
D DIMER PPP FEU-MCNC: 5.3 UG/ML FEU (ref 0–0.5)
GFR SERPL CREATININE-BSD FRML MDRD: 73 ML/MIN/1.7M2
GLUCOSE SERPL-MCNC: 91 MG/DL (ref 70–99)
POTASSIUM SERPL-SCNC: 4.2 MMOL/L (ref 3.4–5.3)
SODIUM SERPL-SCNC: 143 MMOL/L (ref 133–144)

## 2017-12-19 PROCEDURE — 93971 EXTREMITY STUDY: CPT | Mod: LT

## 2017-12-19 NOTE — TELEPHONE ENCOUNTER
Attempted to contact. No answer, I did not leave message. Likely in the ED at  still. Will call later or tomorrow to find out the status.     Jessie Matute, RN- [facility] RN

## 2017-12-19 NOTE — TELEPHONE ENCOUNTER
Note from provider: He is going to [facility] today, but will probably need follow-up later today or am tomorrow for Lovenox bridging and medication.  Thanks, Leslie Cali  (Routing comment)    Jessie Matute RN- [facility] RN

## 2017-12-19 NOTE — TELEPHONE ENCOUNTER
Spoke with pt. Reports he's still at [facility], he was found to have PE's as well. They will be transferring him to [facility] for admission. I asked that he let us know when he's discharged for an update on his status, meds and likely INR visits.     Jessie Matute, RN- [facility] RN

## 2017-12-20 ENCOUNTER — TRANSFERRED RECORDS (OUTPATIENT)
Dept: HEALTH INFORMATION MANAGEMENT | Facility: CLINIC | Age: 57
End: 2017-12-20

## 2017-12-20 LAB — EJECTION FRACTION: 68

## 2017-12-22 NOTE — PROGRESS NOTES
"  SUBJECTIVE:                                                    Estuardo Bowden is a 57 year old male who presents to clinic today for the following health issues:           Blood clot  F/U--    This patient was recently hospitalized at [facility] after being found to have an acute DVT of the left lower extremity and bilateral pulmonary emboli.  He had mild respiratory changes.  He spent 2 days in the hospital and is now return to normal activity, mainly sedentary.  He did not require invasive procedures.  He is on Xarelto for anticoagulation.  His respiratory status is stable.    Uncertain as to the cause of the DVT.  He did have a plane flight a few weeks prior to the onset of symptoms, 3 hours in the ear.  He has been treated for prostate cancer with surgery, radiation and now Lupron therapy.  He states he is \"cancer free\".  Otherwise this may be an unprovoked DVT.    On echocardiogram in the hospital setting, he had mild pulmonary hypertension but otherwise good LV function.    Onset: ongoing for 8 days     Description:  Blood Clot  was in the  back of the left knee and went into the chest.       Intensity: mild    Progression of Symptoms:  improving    Accompanying Signs & Symptoms:  occasional dull pain     Previous history of similar problem:   None     Precipitating factors:   Worsened by:  Standing in place too long         Problem list and histories reviewed & adjusted, as indicated.  Additional history: as documented        ROS:  C: NEGATIVE for fever, chills, change in weight  CONSTITUTIONAL: Moderately overweight  I: NEGATIVE for worrisome rashes, moles or lesions  E: NEGATIVE for vision changes or irritation  E/M: NEGATIVE for ear, mouth and throat problems  R: NEGATIVE for significant cough or SOB  RESP: Stable on current medications with no symptoms of dyspnea.  CV: NEGATIVE for chest pain, palpitations or peripheral edema  GI: NEGATIVE for nausea, abdominal pain, heartburn, or change in " "bowel habits   male : Post prostate cancer surgery and radiation therapy.  Has some mild incontinence and urge incontinence.  MUSCULOSKELETAL: Mild amount of residual left lower extremity swelling remaining with minimal discomfort.  N: NEGATIVE for weakness, dizziness or paresthesias  E: NEGATIVE for temperature intolerance, skin/hair changes  H: NEGATIVE for bleeding problems  P: NEGATIVE for changes in mood or affect    OBJECTIVE:                                                    /70  Pulse 90  Temp 96.7  F (35.9  C) (Temporal)  Resp 16  Ht 5' 6.81\" (1.697 m)  Wt 210 lb (95.3 kg)  SpO2 97%  BMI 33.08 kg/m2  Body mass index is 33.08 kg/(m^2).  GENERAL: healthy, alert and no distress  GENERAL: cooperative and over weight  HENT: ear canals- normal; TMs- normal; Nose- normal; Mouth- no ulcers, no lesions  NECK: no tenderness, no adenopathy, no asymmetry, no masses, no stiffness; thyroid- normal to palpation  RESP: lungs clear to auscultation - no rales, no rhonchi, no wheezes  CV: regular rates and rhythm, normal S1 S2, no S3 or S4 and no murmur, no click or rub -  ABDOMEN: soft, no tenderness, no  hepatosplenomegaly, no masses, normal bowel sounds  MS: extremities- no gross deformities noted, no edema  MS: Slight edema of the left lower extremity in the mid lower leg with negative Homans sign and no deep venous tenderness.  SKIN: no suspicious lesions, no rashes  NEURO: strength and tone- normal, sensory exam- grossly normal, mentation- intact, speech- normal, reflexes- symmetric  PSYCH: Alert and oriented times 3; speech- coherent , normal rate and volume; able to articulate logical thoughts, able to abstract reason, no tangential thoughts, no hallucinations or delusions, affect- normal  LYMPHATICS: ant. cervical- normal, post. cervical- normal, axillary- normal, supraclavicular- normal, inguinal- normal    Diagnostic test results:  Diagnostic Test Results:  none        ASSESSMENT/PLAN:               "                                      1. Other acute pulmonary embolism without acute cor pulmonale (H)  Stable on Xarelto.  He will be on 15 mg twice daily for 3 weeks and then switch to 20 mg daily.  Request follow-up in 3 months.    2. Acute deep vein thrombosis (DVT) of other specified vein of left lower extremity (H)  Continue Xarelto for 6 months.  Follow-up in 3 months    3. Prostate cancer (H)  Therapy directed by his urologist.  Current PSA is nonexistent.    4. Pulmonary hypertension  Noted on echocardiogram.  Possibly from pulmonary emboli.  Could also be from obesity.      Follow up with Provider -Follow-up in 3 months or as needed.  Continue current medications for minimum of 6 months.  See Patient Instructions    The patient understood the rational for the diagnosis and treatment plan. All questions were answered to best of my ability and the patient's satisfaction.    Note: Chart documentation done in part with Dragon Voice Recognition software. Although reviewed after completion, some word and grammatical errors may remain.  Please consider this when interpreting information in this chart.      Hernandez Luna MD  [facility]  Answers for HPI/ROS submitted by the patient on 12/28/2017   Chronic problems general questions HPI Form  Diet:: Regular (no restrictions)  Taking medications regularly:: Yes  Medication side effects:: Muscle aches  Additional concerns today:: No  PHQ-2 Score: 0  Symptoms:: Pain in calves walking 1-2 blocks  Worsened or new symptoms since last visit:: No  Nitroglycerin use:: None  Daily ASA:: NO

## 2017-12-28 ENCOUNTER — OFFICE VISIT (OUTPATIENT)
Dept: FAMILY MEDICINE | Facility: CLINIC | Age: 57
End: 2017-12-28
Payer: COMMERCIAL

## 2017-12-28 VITALS
SYSTOLIC BLOOD PRESSURE: 130 MMHG | HEIGHT: 67 IN | HEART RATE: 90 BPM | OXYGEN SATURATION: 97 % | TEMPERATURE: 96.7 F | WEIGHT: 210 LBS | RESPIRATION RATE: 16 BRPM | DIASTOLIC BLOOD PRESSURE: 70 MMHG | BODY MASS INDEX: 32.96 KG/M2

## 2017-12-28 DIAGNOSIS — I27.20 PULMONARY HYPERTENSION (H): ICD-10-CM

## 2017-12-28 DIAGNOSIS — C61 PROSTATE CANCER (H): ICD-10-CM

## 2017-12-28 DIAGNOSIS — I26.99 OTHER ACUTE PULMONARY EMBOLISM WITHOUT ACUTE COR PULMONALE (H): Primary | ICD-10-CM

## 2017-12-28 DIAGNOSIS — E66.01 MORBID OBESITY (H): ICD-10-CM

## 2017-12-28 DIAGNOSIS — I82.492 ACUTE DEEP VEIN THROMBOSIS (DVT) OF OTHER SPECIFIED VEIN OF LEFT LOWER EXTREMITY (H): ICD-10-CM

## 2017-12-28 PROCEDURE — 99214 OFFICE O/P EST MOD 30 MIN: CPT | Performed by: FAMILY MEDICINE

## 2017-12-28 ASSESSMENT — PAIN SCALES - GENERAL: PAINLEVEL: NO PAIN (0)

## 2017-12-28 NOTE — PATIENT INSTRUCTIONS
These are new changes to your current plan of care based on today's visit:    Medications stopped    Medications to be started    Change dose of this medication none   New treatments        Follow up appointments:    1.  FOLLOW UP WITH YOUR PRIMARY CARE PROVIDER: 3 month(s) for clinic visit for follow up    2. FOLLOW UP WITH SPECIALIST: As planned    Hernandez Luna MD

## 2017-12-28 NOTE — MR AVS SNAPSHOT
After Visit Summary   12/28/2017    Estuardo Bowden    MRN: [id number]           Patient Information     Date Of Birth          1960        Visit Information        Provider Department      12/28/2017 11:40 AM Hernandez Luna MD [facility]        Care Instructions    These are new changes to your current plan of care based on today's visit:    Medications stopped    Medications to be started    Change dose of this medication none   New treatments        Follow up appointments:    1.  FOLLOW UP WITH YOUR PRIMARY CARE PROVIDER: 3 month(s) for clinic visit for follow up    2. FOLLOW UP WITH SPECIALIST: As planned    Hernandez Luna MD                Follow-ups after your visit        Follow-up notes from your care team     Return in about 3 months (around 3/28/2018) for Routine Visit.      Your next 10 appointments already scheduled     Jun 05, 2018  9:00 AM CDT   Return Visit with Shaun James MD   [facility]    [address] [place] [id number] [phone]              Who to contact     If you have questions or need follow up information about today's clinic visit or your schedule please contact [facility] directly at [phone].  Normal or non-critical lab and imaging results will be communicated to you by MyChart, letter or phone within 4 business days after the clinic has received the results. If you do not hear from us within 7 days, please contact the clinic through MyChart or phone. If you have a critical or abnormal lab result, we will notify you by phone as soon as possible.  Submit refill requests through SmartCrowds or call your pharmacy and they will forward the refill request to us. Please allow 3 business days for your refill to be completed.          Additional Information About Your Visit        [employer] Information     SmartCrowds gives you secure access to your electronic health  "record. If you see a primary care provider, you can also send messages to your care team and make appointments. If you have questions, please call your primary care clinic.  If you do not have a primary care provider, please call [phone] and they will assist you.        Care EveryWhere ID     This is your Care EveryWhere ID. This could be used by other organizations to access your [place] medical records  [id number]        Your Vitals Were     Pulse Temperature Respirations Height Pulse Oximetry BMI (Body Mass Index)    90 96.7  F (35.9  C) (Temporal) 16 5' 6.81\" (1.697 m) 97% 33.08 kg/m2       Blood Pressure from Last 3 Encounters:   12/28/17 130/70   12/18/17 138/76   12/04/17 141/69    Weight from Last 3 Encounters:   12/28/17 210 lb (95.3 kg)   12/18/17 215 lb 9.6 oz (97.8 kg)   12/04/17 215 lb 1.6 oz (97.6 kg)              Today, you had the following     No orders found for display       Primary Care Provider Office Phone # Fax #    Hernandez Luna [phone] [address] [facility] [address]        Equal Access to Services     CHATA VALADEZ : Hadii aad ku hadasho Soomaali, waaxda luqadaha, qaybta kaalmada adeegyada, waxay idiin hayalison kay labarbara . So [facility] [phone].    ATENCIÓN: Si habla español, tiene a sage disposición servicios gratuitos de asistencia lingüística. Llame al [phone].    We comply with applicable federal civil rights laws and [place] laws. We do not discriminate on the basis of race, color, national origin, age, disability, sex, sexual orientation, or gender identity.            Thank you!     Thank you for choosing [facility]  for your care. Our goal is always to provide you with excellent care. Hearing back from our patients is one way we can continue to improve our services. Please take a few minutes to complete the written survey that you may receive in the mail after your visit with us. Thank you!             Your " Updated Medication List - Protect others around you: Learn how to safely use, store and throw away your medicines at www.disposemymeds.org.          This list is accurate as of: 12/28/17 12:05 PM.  Always use your most recent med list.                   Brand Name Dispense Instructions for use Diagnosis    calcium 1200 6357-2563 MG-UNIT Chew     30 tablet    Take 1 tablet by mouth daily    Malignant neoplasm of prostate (H)       IMODIUM A-D PO           leuprolide 45 MG kit    ELIGARD     Inject 45 mg Subcutaneous every 6 months        oxybutynin 10 MG 24 hr tablet    DITROPAN XL    90 tablet    Take 1 tablet (10 mg) by mouth daily    Urinary frequency       sildenafil 100 MG tablet    VIAGRA    6 tablet    Take 1 tablet (100 mg) by mouth daily as needed 30 min to 4 hrs before sex. Do not use with nitroglycerin, terazosin or doxazosin.    Male impotence

## 2018-01-08 ENCOUNTER — TELEPHONE (OUTPATIENT)
Dept: CARDIOLOGY | Facility: CLINIC | Age: 58
End: 2018-01-08

## 2018-01-08 NOTE — TELEPHONE ENCOUNTER
Called patient prior to stress echo scheduled for Jan 9 to discuss necessary preparation for test and assess for questions/concerns.  Reminded patient to remain NPO except water for at least 3 hours prior to test, take all medications as usual, to avoid caffeine and nicotine for 12 hr (including chocolate, decaf, Excedrin) and to wear comfortable clothing and shoes. All questions answered, patient verbalized understanding.

## 2018-01-09 ENCOUNTER — RADIANT APPOINTMENT (OUTPATIENT)
Dept: CARDIOLOGY | Facility: CLINIC | Age: 58
End: 2018-01-09
Attending: NURSE PRACTITIONER
Payer: COMMERCIAL

## 2018-01-09 VITALS — DIASTOLIC BLOOD PRESSURE: 83 MMHG | SYSTOLIC BLOOD PRESSURE: 132 MMHG | HEART RATE: 60 BPM

## 2018-01-09 DIAGNOSIS — R06.09 DOE (DYSPNEA ON EXERTION): ICD-10-CM

## 2018-01-09 PROCEDURE — 93325 DOPPLER ECHO COLOR FLOW MAPG: CPT | Mod: TC

## 2018-01-09 PROCEDURE — 93321 DOPPLER ECHO F-UP/LMTD STD: CPT | Mod: TC

## 2018-01-09 PROCEDURE — 93325 DOPPLER ECHO COLOR FLOW MAPG: CPT | Mod: 26 | Performed by: INTERNAL MEDICINE

## 2018-01-09 PROCEDURE — 93352 ADMIN ECG CONTRAST AGENT: CPT

## 2018-01-09 PROCEDURE — 93350 STRESS TTE ONLY: CPT | Mod: TC

## 2018-01-09 PROCEDURE — 93018 CV STRESS TEST I&R ONLY: CPT | Performed by: INTERNAL MEDICINE

## 2018-01-09 PROCEDURE — 93017 CV STRESS TEST TRACING ONLY: CPT

## 2018-01-09 PROCEDURE — 93350 STRESS TTE ONLY: CPT | Mod: 26 | Performed by: INTERNAL MEDICINE

## 2018-01-09 PROCEDURE — 93016 CV STRESS TEST SUPVJ ONLY: CPT

## 2018-01-09 PROCEDURE — 93321 DOPPLER ECHO F-UP/LMTD STD: CPT | Mod: 26 | Performed by: INTERNAL MEDICINE

## 2018-01-09 RX ORDER — DOBUTAMINE HYDROCHLORIDE 200 MG/100ML
20 INJECTION INTRAVENOUS CONTINUOUS
Status: SHIPPED | OUTPATIENT
Start: 2018-01-09

## 2018-01-09 RX ORDER — METOPROLOL TARTRATE 1 MG/ML
1 INJECTION, SOLUTION INTRAVENOUS ONCE
Status: COMPLETED | OUTPATIENT
Start: 2018-01-09 | End: 2018-01-09

## 2018-01-09 RX ADMIN — Medication 8 ML: at 09:21

## 2018-01-09 RX ADMIN — METOPROLOL TARTRATE 1 MG: 1 INJECTION, SOLUTION INTRAVENOUS at 09:21

## 2018-01-09 RX ADMIN — DOBUTAMINE HYDROCHLORIDE 20 MCG/KG/MIN: 200 INJECTION INTRAVENOUS at 09:19

## 2018-01-09 NOTE — NURSING NOTE
IV started with a 20g Jelco catheter in the right AC.     Stress portion of Dobutamine Echo started utilizing 20 mcg/kg/min of Dobutamine.   Dobutamine increased to 40 mcg/kg/min by increments of 5 mcg/kg/min.                                                                            Dobutamine NDC# 0616-5368-82  Definity given  8 ml , 2 wasted  ml       1.5ml Definity mixed with 8.5ml Saline - NDC 07361-567-47  Metoprolol medication given 1  mg                   Metoprolol NDC# [address]-17    Dr. Schilling was the supervising physician of this test.

## 2018-02-20 DIAGNOSIS — C61 MALIGNANT NEOPLASM OF PROSTATE (H): ICD-10-CM

## 2018-02-20 LAB — PSA SERPL-MCNC: <0.01 UG/L (ref 0–4)

## 2018-02-20 PROCEDURE — 84153 ASSAY OF PSA TOTAL: CPT | Performed by: RADIOLOGY

## 2018-02-20 PROCEDURE — 36415 COLL VENOUS BLD VENIPUNCTURE: CPT | Performed by: RADIOLOGY

## 2018-02-21 ENCOUNTER — MYC MEDICAL ADVICE (OUTPATIENT)
Dept: UROLOGY | Facility: CLINIC | Age: 58
End: 2018-02-21

## 2018-02-21 NOTE — TELEPHONE ENCOUNTER
RN spoke with Dr. Rust and patient does need Eligard injection. Instructed to schedule follow up appointment for this injection. Closing encounter.    Ambar Cramer RN

## 2018-03-07 ENCOUNTER — OFFICE VISIT (OUTPATIENT)
Dept: UROLOGY | Facility: OTHER | Age: 58
End: 2018-03-07
Payer: COMMERCIAL

## 2018-03-07 VITALS
HEART RATE: 80 BPM | SYSTOLIC BLOOD PRESSURE: 138 MMHG | RESPIRATION RATE: 16 BRPM | DIASTOLIC BLOOD PRESSURE: 90 MMHG | OXYGEN SATURATION: 99 %

## 2018-03-07 DIAGNOSIS — N39.41 URGENCY INCONTINENCE: Primary | ICD-10-CM

## 2018-03-07 DIAGNOSIS — C61 PROSTATE CANCER (H): ICD-10-CM

## 2018-03-07 PROCEDURE — 99214 OFFICE O/P EST MOD 30 MIN: CPT | Mod: 25 | Performed by: UROLOGY

## 2018-03-07 PROCEDURE — 96402 CHEMO HORMON ANTINEOPL SQ/IM: CPT | Performed by: UROLOGY

## 2018-03-07 PROCEDURE — 51798 US URINE CAPACITY MEASURE: CPT | Performed by: UROLOGY

## 2018-03-07 NOTE — PROGRESS NOTES
Bladder Scan performed. 45 mL maximum residual urine detected after 3 scans. MD informed     Prior to injection verified patient identity using patient's name and date of birth.    Due to injection administration, patient instructed to remain in clinic for 15 minutes  afterwards, and to report any adverse reaction to me immediately.

## 2018-03-07 NOTE — MR AVS SNAPSHOT
After Visit Summary   3/7/2018    Estuardo Bowden    MRN: [id number]           Patient Information     Date Of Birth          1960        Visit Information        Provider Department      3/7/2018 10:30 AM Ross Rust MD [facility]        Today's Diagnoses     Urgency incontinence    -  1    Prostate cancer (H)           Follow-ups after your visit        Your next 10 appointments already scheduled     Jun 05, 2018  9:00 AM CDT   Return Visit with Shaun James MD   [facility]    [address] [place] [id number] [phone]              Future tests that were ordered for you today     Open Future Orders        Priority Expected Expires Ordered    PSA tumor marker Routine 9/7/2018 3/7/2019 3/7/2018            Who to contact     If you have questions or need follow up information about today's clinic visit or your schedule please contact [facility] directly at [phone].  Normal or non-critical lab and imaging results will be communicated to you by [employer]hart, letter or phone within 4 business days after the clinic has received the results. If you do not hear from us within 7 days, please contact the clinic through [email or web address] or phone. If you have a critical or abnormal lab result, we will notify you by phone as soon as possible.  Submit refill requests through boo-box or call your pharmacy and they will forward the refill request to us. Please allow 3 business days for your refill to be completed.          Additional Information About Your Visit        [employer] Information     boo-box gives you secure access to your electronic health record. If you see a primary care provider, you can also send messages to your care team and make appointments. If you have questions, please call your primary care clinic.  If you do not have a primary care provider, please call [phone] and they will  assist you.        Care EveryWhere ID     This is your Care EveryWhere ID. This could be used by other organizations to access your [place] medical records  [id number]        Your Vitals Were     Pulse Respirations Pulse Oximetry             80 16 99%          Blood Pressure from Last 3 Encounters:   03/07/18 138/90   01/09/18 132/83   12/28/17 130/70    Weight from Last 3 Encounters:   12/28/17 95.3 kg (210 lb)   12/18/17 97.8 kg (215 lb 9.6 oz)   12/04/17 97.6 kg (215 lb 1.6 oz)              We Performed the Following     MEASURE POST-VOID RESIDUAL URINE/BLADDER CAPACITY, US NON-IMAGING (09267)        Primary Care Provider Office Phone # Fax #    Hernandez Luna [phone] [address] [facility] [address]        Equal Access to Services     [facility]: Hadii aad ku hadasho Soomaali, waaxda luqadaha, qaybta kaalmada adeegyada, waxtay estevezin hayjimmien aureliano davis . So [facility] [phone].    ATENCIÓN: Si habla español, tiene a sage disposición servicios gratuitos de asistencia lingüística. Llame al [phone].    We comply with applicable federal civil rights laws and [place] laws. We do not discriminate on the basis of race, color, national origin, age, disability, sex, sexual orientation, or gender identity.            Thank you!     Thank you for choosing [facility]  for your care. Our goal is always to provide you with excellent care. Hearing back from our patients is one way we can continue to improve our services. Please take a few minutes to complete the written survey that you may receive in the mail after your visit with us. Thank you!             Your Updated Medication List - Protect others around you: Learn how to safely use, store and throw away your medicines at www.disposemymeds.org.          This list is accurate as of 3/7/18 11:11 AM.  Always use your most recent med list.                   Brand Name Dispense Instructions for use  Diagnosis    calcium 1200 6291-8676 MG-UNIT Chew     30 tablet    Take 1 tablet by mouth daily    Malignant neoplasm of prostate (H)       IMODIUM A-D PO           leuprolide 45 MG kit    ELIGARD     Inject 45 mg Subcutaneous every 6 months        oxybutynin 10 MG 24 hr tablet    DITROPAN XL    90 tablet    Take 1 tablet (10 mg) by mouth daily    Urinary frequency       sildenafil 100 MG tablet    VIAGRA    6 tablet    Take 1 tablet (100 mg) by mouth daily as needed 30 min to 4 hrs before sex. Do not use with nitroglycerin, terazosin or doxazosin.    Male impotence

## 2018-03-07 NOTE — PROGRESS NOTES
Chief Complaint   Patient presents with     Consult     Eligard injection. Patient reports that he is still having incontinence.       Estuardo Bowden is a 57 year old male who presents today for follow up of   Chief Complaint   Patient presents with     Consult     Eligard injection. Patient reports that he is still having incontinence.   f/u for prostate cancer s/p bnc dilation He has seen radiation oncology and finished salvage XRT.   He c/o urgency incontinence.  His urinary flow is ok.  He has no straining.  He also has ED, partial.      Current Outpatient Prescriptions   Medication Sig Dispense Refill     Calcium Carbonate-Vit D-Min (CALCIUM 1200) 9031-1709 MG-UNIT CHEW Take 1 tablet by mouth daily 30 tablet      oxybutynin (DITROPAN XL) 10 MG 24 hr tablet Take 1 tablet (10 mg) by mouth daily 90 tablet 1     sildenafil (VIAGRA) 100 MG tablet Take 1 tablet (100 mg) by mouth daily as needed 30 min to 4 hrs before sex. Do not use with nitroglycerin, terazosin or doxazosin. 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 sequale     Prostate cancer (H) 05/07/2015     Past Surgical History:   Procedure Laterality Date     ARTHROTOMY SHOULDER, ROTATOR CUFF REPAIR, COMBINED Right 10/7/2016    Right RCR, Russell Farley     BIOPSY PROSTATE TRANSRECTAL  05/07/2015     BIOPSY PROSTATE TRANSRECTAL  07/06/2016     CYSTOSCOPY, DILATE URETHRA, COMBINED N/A 5/11/2017    Procedure: COMBINED CYSTOSCOPY, DILATE URETHRA;  Cystoscopy, ballon dilation;  Surgeon: Ross Rust MD;  Location:  OR     PROSTATECTOMY RETROPUBIC RADICAL  12/19/2016     SIGMOIDOSCOPY FLEXIBLE  7/15/2013    Procedure: SIGMOIDOSCOPY FLEXIBLE;  Sigmoidoscopy Flexible;  Surgeon: Dusty Chang MD;  Location: [facility]  History   Problem Relation Age of Onset     DIABETES Maternal Grandmother      Hypertension Mother      Prostate Cancer Maternal Grandfather      C.A.D. No family hx of      Coronary Artery Disease No family hx of      Breast Cancer No family hx of      Ovarian Cancer No family hx of      Mental Illness No family hx of      Asthma No family hx of      Obesity No family hx of      Unknown/Adopted No family hx of      Anesthesia Reaction No family hx of      CEREBROVASCULAR DISEASE No family hx of      Other Cancer No family hx of      Cancer - colorectal No family hx of      Hyperlipidemia No family hx of      Known Genetic Syndrome No family hx of      OSTEOPOROSIS No family hx of      Depression No family hx of      Anxiety Disorder No family hx of      MENTAL ILLNESS No family hx of      Substance Abuse No family hx of      Thyroid Disease No family hx of      Genetic Disorder No family hx of      Colon Cancer No family hx of      Social History     Social History     Marital status:      Spouse name: N/A     Number of children: 2     Years of education: N/A     Occupational History      [employer]     Social History Main Topics     Smoking status: Never Smoker     Smokeless tobacco: Current User     Types: Chew      Comment: 1 tin every 3 days     Alcohol use 7.2 oz/week     12 Cans of beer, 0 Standard drinks or equivalent per week      Comment: 12 beers per week     Drug use: No     Sexual activity: Yes     Partners: Female     Birth control/ protection: None     Other Topics Concern     Parent/Sibling W/ Cabg, Mi Or Angioplasty Before 65f 55m? No      Service No     Blood Transfusions No     Caffeine Concern No     Occupational Exposure No     Hobby Hazards No     Sleep Concern No     Stress Concern No     Weight Concern No     Special Diet No     Back Care No     Exercise Yes     [employer] 12+ times per month     Seat Belt Yes     Self-Exams Yes     Social History Narrative        REVIEW OF SYSTEMS  =================  C: NEGATIVE for fever, chills, change in weight  I: NEGATIVE for worrisome rashes, moles or lesions  E/M: NEGATIVE for ear, mouth and throat problems  R: NEGATIVE for significant cough or SHORTNESS OF BREATH,   CV: NEGATIVE for chest pain, palpitations or peripheral edema  GI: NEGATIVE for nausea, abdominal pain, heartburn, or change in bowel habits  NEURO: NEGATIVE any motor/sensory changes  PSYCH: NEGATIVE for recent mood disorder    Physical Exam:  /90 (BP Location: Right arm, Patient Position: Chair, Cuff Size: Adult Large)  Pulse 80  Resp 16  SpO2 99%   Patient is pleasant, in no acute distress, good general condition.  Lung: no evidence of respiratory distress    Abdomen: Soft, nondistended, non tender. No masses. No rebound or guarding.   Exam: no cva tenderness.  Bladder scan minimal residual urine  Skin: Warm and dry.  No redness.  Psych: normal mood and affect  Neuro: alert and oriented    Assessment/Plan:   (C61) Prostate cancer (H)  (primary encounter diagnosis)  Comment: psa<0.01   Plan:  S/p RRP and XRT for group 4 prostate cancer             eligard today             Recheck psa in six months    Urgency incontinence:    Comment: s/p dilation. Bladder scan 45 ml  Plan:  Ditropan prn    ED:  viagra prescribed.  Instructions given.

## 2018-04-02 ENCOUNTER — MYC MEDICAL ADVICE (OUTPATIENT)
Dept: UROLOGY | Facility: CLINIC | Age: 58
End: 2018-04-02

## 2018-04-02 NOTE — TELEPHONE ENCOUNTER
Called and spoke to patient.   Patient states that he has no flow and has to strain to urinate.   This started last week and had progressively gotten worse.   Patient scheduled for next day appointment.   Lita Appiah RN

## 2018-04-03 ENCOUNTER — TRANSFERRED RECORDS (OUTPATIENT)
Dept: HEALTH INFORMATION MANAGEMENT | Facility: CLINIC | Age: 58
End: 2018-04-03

## 2018-04-03 ENCOUNTER — OFFICE VISIT (OUTPATIENT)
Dept: UROLOGY | Facility: CLINIC | Age: 58
End: 2018-04-03
Payer: COMMERCIAL

## 2018-04-03 ENCOUNTER — MYC MEDICAL ADVICE (OUTPATIENT)
Dept: UROLOGY | Facility: CLINIC | Age: 58
End: 2018-04-03

## 2018-04-03 VITALS — SYSTOLIC BLOOD PRESSURE: 136 MMHG | HEART RATE: 88 BPM | DIASTOLIC BLOOD PRESSURE: 86 MMHG | RESPIRATION RATE: 14 BRPM

## 2018-04-03 DIAGNOSIS — R39.198 SLOWING OF URINARY STREAM: ICD-10-CM

## 2018-04-03 DIAGNOSIS — Z01.818 PRE-OPERATIVE EXAMINATION: Primary | ICD-10-CM

## 2018-04-03 LAB
ANION GAP SERPL CALCULATED.3IONS-SCNC: 7 MMOL/L (ref 3–14)
BUN SERPL-MCNC: 14 MG/DL (ref 7–30)
CALCIUM SERPL-MCNC: 9.2 MG/DL (ref 8.5–10.1)
CHLORIDE SERPL-SCNC: 106 MMOL/L (ref 94–109)
CO2 SERPL-SCNC: 28 MMOL/L (ref 20–32)
CREAT SERPL-MCNC: 0.88 MG/DL (ref 0.66–1.25)
ERYTHROCYTE [DISTWIDTH] IN BLOOD BY AUTOMATED COUNT: 13.9 % (ref 10–15)
GFR SERPL CREATININE-BSD FRML MDRD: 89 ML/MIN/1.7M2
GLUCOSE SERPL-MCNC: 99 MG/DL (ref 70–99)
HCT VFR BLD AUTO: 41.3 % (ref 40–53)
HGB BLD-MCNC: 14 G/DL (ref 13.3–17.7)
MCH RBC QN AUTO: 33.3 PG (ref 26.5–33)
MCHC RBC AUTO-ENTMCNC: 33.9 G/DL (ref 31.5–36.5)
MCV RBC AUTO: 98 FL (ref 78–100)
PLATELET # BLD AUTO: 243 10E9/L (ref 150–450)
POTASSIUM SERPL-SCNC: 4.3 MMOL/L (ref 3.4–5.3)
RBC # BLD AUTO: 4.21 10E12/L (ref 4.4–5.9)
SODIUM SERPL-SCNC: 141 MMOL/L (ref 133–144)
WBC # BLD AUTO: 6.3 10E9/L (ref 4–11)

## 2018-04-03 PROCEDURE — 80048 BASIC METABOLIC PNL TOTAL CA: CPT | Performed by: UROLOGY

## 2018-04-03 PROCEDURE — 85027 COMPLETE CBC AUTOMATED: CPT | Performed by: UROLOGY

## 2018-04-03 PROCEDURE — 36415 COLL VENOUS BLD VENIPUNCTURE: CPT | Performed by: UROLOGY

## 2018-04-03 PROCEDURE — 99213 OFFICE O/P EST LOW 20 MIN: CPT | Mod: 25 | Performed by: UROLOGY

## 2018-04-03 PROCEDURE — 52281 CYSTOSCOPY AND TREATMENT: CPT | Performed by: UROLOGY

## 2018-04-03 NOTE — PROGRESS NOTES
S: 57 y.o. Male with h/o prostate cancer s/p RRP/XRT.  He c/o slower urinary stream for one week.  He had h/o BNC s/p dilation in the past.  He denies any dysuria/hematuria.  Current Outpatient Prescriptions   Medication Sig Dispense Refill     Calcium Carbonate-Vit D-Min (CALCIUM 1200) 7048-9071 MG-UNIT CHEW Take 1 tablet by mouth daily 30 tablet      oxybutynin (DITROPAN XL) 10 MG 24 hr tablet Take 1 tablet (10 mg) by mouth daily 90 tablet 1     sildenafil (VIAGRA) 100 MG tablet Take 1 tablet (100 mg) by mouth daily as needed 30 min to 4 hrs before sex. Do not use with nitroglycerin, terazosin or doxazosin. 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     Prostate cancer (H) 05/07/2015     Past Surgical History:   Procedure Laterality Date     ARTHROTOMY SHOULDER, ROTATOR CUFF REPAIR, COMBINED Right 10/7/2016    Right RCRMartine Mumford     BIOPSY PROSTATE TRANSRECTAL  05/07/2015     BIOPSY PROSTATE TRANSRECTAL  07/06/2016     CYSTOSCOPY, DILATE URETHRA, COMBINED N/A 5/11/2017    Procedure: COMBINED CYSTOSCOPY, DILATE URETHRA;  Cystoscopy, ballon dilation;  Surgeon: Ross Rust MD;  Location:  OR     PROSTATECTOMY RETROPUBIC RADICAL  12/19/2016     SIGMOIDOSCOPY FLEXIBLE  7/15/2013    Procedure: SIGMOIDOSCOPY FLEXIBLE;  Sigmoidoscopy Flexible;  Surgeon: Dusty Chang MD;  Location:  GI      Family History   Problem Relation Age of Onset     DIABETES Maternal Grandmother      Hypertension Mother      Prostate Cancer Maternal Grandfather      C.A.D. No family hx of      Coronary Artery Disease No family hx of      Breast Cancer No family hx of      Ovarian Cancer No family hx of      Mental Illness No family hx of      Asthma No family hx of      Obesity No family hx of       Unknown/Adopted No family hx of      Anesthesia Reaction No family hx of      CEREBROVASCULAR DISEASE No family hx of      Other Cancer No family hx of      Cancer - colorectal No family hx of      Hyperlipidemia No family hx of      Known Genetic Syndrome No family hx of      OSTEOPOROSIS No family hx of      Depression No family hx of      Anxiety Disorder No family hx of      MENTAL ILLNESS No family hx of      Substance Abuse No family hx of      Thyroid Disease No family hx of      Genetic Disorder No family hx of      Colon Cancer No family hx of      Social History     Social History     Marital status:      Spouse name: N/A     Number of children: 2     Years of education: N/A     Occupational History      [employer]     Social History Main Topics     Smoking status: Never Smoker     Smokeless tobacco: Current User     Types: Chew      Comment: 1 tin every 3 days     Alcohol use 7.2 oz/week     12 Cans of beer, 0 Standard drinks or equivalent per week      Comment: 12 beers per week     Drug use: No     Sexual activity: Yes     Partners: Female     Birth control/ protection: None     Other Topics Concern     Parent/Sibling W/ Cabg, Mi Or Angioplasty Before 65f 55m? No      Service No     Blood Transfusions No     Caffeine Concern No     Occupational Exposure No     Hobby Hazards No     Sleep Concern No     Stress Concern No     Weight Concern No     Special Diet No     Back Care No     Exercise Yes     [employer] 12+ times per month     Seat Belt Yes     Self-Exams Yes     Social History Narrative       REVIEW OF SYSTEMS  =================  C: NEGATIVE for fever, chills, change in weight  I: NEGATIVE for worrisome rashes, moles or lesions  E/M: NEGATIVE for ear, mouth and throat problems  R: NEGATIVE for significant cough or SHORTNESS OF BREATH  CV:  NEGATIVE for chest pain, palpitations or peripheral edema  GI: NEGATIVE for nausea, abdominal pain, heartburn, or change in bowel  habits  NEURO: NEGATIVE numbness/weakness  : see HPI  PSYCH: NEGATIVE depression/anxiety  LYmph: no new enlarged lymph nodes  Ortho: no new trauma/movements      Physical Exam:    Patient is pleasant, in no acute distress, good general condition.  HEENT:  Normalcephalic, atraumatic  Heart: RRR  Lung: no evidence of respiratory distress    Abdomen: obese   Exam: no cva tenderness.    Skin: Warm and dry.  No redness.  Neuro: grossly normal  Psych normal mood and affect  Musculoskeletal  moving all extremities    Cysto done today    Patient is draped and prepped.  Flexible cystoscopy placed under direct vision.      The anterior urethra is normal   The prostatic urethra is missing   A pinhole BNC seen.             A 0.035 souza wire placed into bladder.             Paymentus dilators were then used to dilate the BNC.           I dilated it up to 12 F and encountered resistance.             I decided to stop and planned to bring in the OR.    Assessment/Plan:  (Z01.818) Pre-operative examination  (primary encounter diagnosis)  Comment:  Ok for surgery.  Plan: Basic metabolic panel [LAB15], CBC with         platelets [SVI004]             (R39.198) Slowing of urinary stream  Comment:    Plan: to OR today

## 2018-04-03 NOTE — MR AVS SNAPSHOT
After Visit Summary   4/3/2018    Estuardo Bowden    MRN: [id number]           Patient Information     Date Of Birth          1960        Visit Information        Provider Department      4/3/2018 8:30 AM Ross Rust MD; [facility] PROC ROOM [facility]        Today's Diagnoses     Pre-operative examination    -  1    Slowing of urinary stream           Follow-ups after your visit        Your next 10 appointments already scheduled     Jun 05, 2018  9:00 AM CDT   Return Visit with Shaun James MD   [facility]    [id number] [place] [address] [phone]              Who to contact     If you have questions or need follow up information about today's clinic visit or your schedule please contact [facility] directly at [phone].  Normal or non-critical lab and imaging results will be communicated to you by MyChart, letter or phone within 4 business days after the clinic has received the results. If you do not hear from us within 7 days, please contact the clinic through MyChart or phone. If you have a critical or abnormal lab result, we will notify you by phone as soon as possible.  Submit refill requests through OwlTing ??? or call your pharmacy and they will forward the refill request to us. Please allow 3 business days for your refill to be completed.          Additional Information About Your Visit        MyChart Information     OwlTing ??? gives you secure access to your electronic health record. If you see a primary care provider, you can also send messages to your care team and make appointments. If you have questions, please call your primary care clinic.  If you do not have a primary care provider, please call [phone] and they will assist you.        Care EveryWhere ID     This is your Care EveryWhere ID. This could be used by other organizations to access your [place]  medical records  [id number]        Your Vitals Were     Pulse Respirations                88 14           Blood Pressure from Last 3 Encounters:   04/03/18 136/86   03/07/18 138/90   01/09/18 132/83    Weight from Last 3 Encounters:   12/28/17 95.3 kg (210 lb)   12/18/17 97.8 kg (215 lb 9.6 oz)   12/04/17 97.6 kg (215 lb 1.6 oz)              We Performed the Following     Basic metabolic panel [LAB15]     CBC with platelets [DKS758]     CYSTOSCOPY W DIL URETHRAL STRICTURE/CALIBRATION (49003)        Primary Care Provider Office Phone # Fax #    Hernandez Luna [phone] [address] [facility] [address]        Equal Access to Services     PARTHA VALADEZ : Hadii aad ku hadasho Soomaali, waaxda luqadaha, qaybta kaalmada adeegyada, lance davis . So [facility] [phone].    ATENCIÓN: Si habla español, tiene a sage disposición servicios gratuitos de asistencia lingüística. Llame al [phone].    We comply with applicable federal civil rights laws and [place] laws. We do not discriminate on the basis of race, color, national origin, age, disability, sex, sexual orientation, or gender identity.            Thank you!     Thank you for choosing [facility]  for your care. Our goal is always to provide you with excellent care. Hearing back from our patients is one way we can continue to improve our services. Please take a few minutes to complete the written survey that you may receive in the mail after your visit with us. Thank you!             Your Updated Medication List - Protect others around you: Learn how to safely use, store and throw away your medicines at www.disposemymeds.org.          This list is accurate as of 4/3/18 10:41 AM.  Always use your most recent med list.                   Brand Name Dispense Instructions for use Diagnosis    calcium 1200 5477-0002 MG-UNIT Chew     30 tablet    Take 1 tablet by mouth daily    Malignant neoplasm  of prostate (H)       IMODIUM A-D PO           leuprolide 45 MG kit    ELIGARD     Inject 45 mg Subcutaneous every 6 months        oxybutynin 10 MG 24 hr tablet    DITROPAN XL    90 tablet    Take 1 tablet (10 mg) by mouth daily    Urinary frequency       sildenafil 100 MG tablet    VIAGRA    6 tablet    Take 1 tablet (100 mg) by mouth daily as needed 30 min to 4 hrs before sex. Do not use with nitroglycerin, terazosin or doxazosin.    Male impotence

## 2018-04-03 NOTE — TELEPHONE ENCOUNTER
Called and spoke to patient.   Patient is not feeling well and is faint.   Patient underwent a procedure in clinic and is in retention.   Patient is scheduled for cysto/balloon dilation this afternoon.   Huddled with Dr. Rust, patient to go to [facility] now.   Caller agrees.   Lita Appiah RN

## 2018-04-05 LAB
CREAT SERPL-MCNC: 1.23 MG/DL (ref 0.7–1.3)
GFR SERPL CREATININE-BSD FRML MDRD: 60 ML/MIN/1.73M2
GLUCOSE SERPL-MCNC: 99 MG/DL (ref 70–110)
POTASSIUM SERPL-SCNC: 3.8 MMOL/L (ref 3.5–5.1)

## 2018-04-09 ENCOUNTER — TELEPHONE (OUTPATIENT)
Dept: UROLOGY | Facility: CLINIC | Age: 58
End: 2018-04-09

## 2018-04-09 NOTE — TELEPHONE ENCOUNTER
Called and spoke to patient.   Needs to scheduled post-op follow up with possible catheter removal.   Patient scheduled.   Lita Appiah RN

## 2018-04-09 NOTE — TELEPHONE ENCOUNTER
Reason for Call:  Other appointment    Detailed comments:  Patient calling. He is to have an appointment this Thursday for a catheter change. Please call him to set up.     Phone Number Patient can be reached at: Cell number on file:    Telephone Information:   Mobile [phone]       Best Time:  Any     Can we leave a detailed message on this number? YES    Call taken on 4/9/2018 at 9:36 AM by Emily Pacheco

## 2018-04-19 ENCOUNTER — OFFICE VISIT (OUTPATIENT)
Dept: UROLOGY | Facility: OTHER | Age: 58
End: 2018-04-19
Payer: COMMERCIAL

## 2018-04-19 VITALS
RESPIRATION RATE: 14 BRPM | WEIGHT: 217 LBS | HEIGHT: 67 IN | DIASTOLIC BLOOD PRESSURE: 88 MMHG | SYSTOLIC BLOOD PRESSURE: 154 MMHG | BODY MASS INDEX: 34.06 KG/M2

## 2018-04-19 DIAGNOSIS — N32.0 BLADDER NECK CONTRACTURE: Primary | ICD-10-CM

## 2018-04-19 DIAGNOSIS — R03.0 ELEVATED BLOOD PRESSURE READING WITHOUT DIAGNOSIS OF HYPERTENSION: ICD-10-CM

## 2018-04-19 PROCEDURE — 99213 OFFICE O/P EST LOW 20 MIN: CPT | Performed by: UROLOGY

## 2018-04-19 NOTE — MR AVS SNAPSHOT
After Visit Summary   4/19/2018    Estuardo Bowden    MRN: [id number]           Patient Information     Date Of Birth          1960        Visit Information        Provider Department      4/19/2018 8:45 AM Ross Rust MD [facility]        Today's Diagnoses     Bladder neck contracture    -  1    Elevated blood pressure reading without diagnosis of hypertension           Follow-ups after your visit        Your next 10 appointments already scheduled     Apr 19, 2018  8:45 AM CDT   Return Visit with Ross Rust MD   [facility]    [address] [facility] [address]   [phone]            Apr 23, 2018  9:15 AM CDT   Return Visit with Ross Rust MD   [facility]    [address] [place] [address]   [phone]            Jun 05, 2018  9:00 AM CDT   Return Visit with Shaun James MD   [facility] ([facility])    [address] [place] [address] [phone]              Who to contact     If you have questions or need follow up information about today's clinic visit or your schedule please contact [facility] directly at [phone].  Normal or non-critical lab and imaging results will be communicated to you by MyChart, letter or phone within 4 business days after the clinic has received the results. If you do not hear from us within 7 days, please contact the clinic through MyChart or phone. If you have a critical or abnormal lab result, we will notify you by phone as soon as possible.  Submit refill requests through Harperlabz or call your pharmacy and they will forward the refill request to us. Please allow 3 business days for your refill to be completed.          Additional Information About Your Visit        Ce[employer] Gr[employer]phareDreams Edusoft Information     Harperlabz gives you secure access to your electronic  "health record. If you see a primary care provider, you can also send messages to your care team and make appointments. If you have questions, please call your primary care clinic.  If you do not have a primary care provider, please call [phone] and they will assist you.        Care EveryWhere ID     This is your Care EveryWhere ID. This could be used by other organizations to access your [place] medical records  [id number]        Your Vitals Were     Respirations Height BMI (Body Mass Index)             14 1.702 m (5' 7\") 33.99 kg/m2          Blood Pressure from Last 3 Encounters:   04/19/18 154/88   04/03/18 136/86   03/07/18 138/90    Weight from Last 3 Encounters:   04/19/18 98.4 kg (217 lb)   12/28/17 95.3 kg (210 lb)   12/18/17 97.8 kg (215 lb 9.6 oz)              Today, you had the following     No orders found for display       Primary Care Provider Office Phone # Fax #    Hernandeznia Luna [phone] [address] [facility] [address]        Equal Access to Services     [facility]: Hadii aad ku hadasho Soomaali, waaxda luqadaha, qaybta kaalmada adeegyada, lance davis . So [facility] [phone].    ATENCIÓN: Si habla español, tiene a sage disposición servicios gratuitos de asistencia lingüística. Llame al [phone].    We comply with applicable federal civil rights laws and [place] laws. We do not discriminate on the basis of race, color, national origin, age, disability, sex, sexual orientation, or gender identity.            Thank you!     Thank you for choosing [facility]  for your care. Our goal is always to provide you with excellent care. Hearing back from our patients is one way we can continue to improve our services. Please take a few minutes to complete the written survey that you may receive in the mail after your visit with us. Thank you!             Your Updated Medication List - Protect others around you: Learn " how to safely use, store and throw away your medicines at www.disposemymeds.org.          This list is accurate as of 4/19/18  8:41 AM.  Always use your most recent med list.                   Brand Name Dispense Instructions for use Diagnosis    calcium 1200 1305-1402 MG-UNIT Chew     30 tablet    Take 1 tablet by mouth daily    Malignant neoplasm of prostate (H)       IMODIUM A-D PO           leuprolide 45 MG kit    ELIGARD     Inject 45 mg Subcutaneous every 6 months        oxybutynin 10 MG 24 hr tablet    DITROPAN XL    90 tablet    Take 1 tablet (10 mg) by mouth daily    Urinary frequency       sildenafil 100 MG tablet    VIAGRA    6 tablet    Take 1 tablet (100 mg) by mouth daily as needed 30 min to 4 hrs before sex. Do not use with nitroglycerin, terazosin or doxazosin.    Male impotence

## 2018-04-19 NOTE — PROGRESS NOTES
Chief Complaint   Patient presents with     RECHECK     Post-op ballon dilation       Estuardo Bowden is a 57 year old male who presents today for follow up of   Chief Complaint   Patient presents with     RECHECK     Post-op ballon dilation    f/u post difficult BNC dilation.  He is doing well without any complaints.    Current Outpatient Prescriptions   Medication Sig Dispense Refill     Calcium Carbonate-Vit D-Min (CALCIUM 1200) 5626-4765 MG-UNIT CHEW Take 1 tablet by mouth daily 30 tablet      leuprolide (ELIGARD) 45 MG kit Inject 45 mg Subcutaneous every 6 months       oxybutynin (DITROPAN XL) 10 MG 24 hr tablet Take 1 tablet (10 mg) by mouth daily 90 tablet 1     Loperamide HCl (IMODIUM A-D PO)        sildenafil (VIAGRA) 100 MG tablet Take 1 tablet (100 mg) by mouth daily as needed 30 min to 4 hrs before sex. Do not use with nitroglycerin, terazosin or doxazosin. 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     Prostate cancer (H) 05/07/2015     Past Surgical History:   Procedure Laterality Date     ARTHROTOMY SHOULDER, ROTATOR CUFF REPAIR, COMBINED Right 10/7/2016    Right Martine CASE [place]     BIOPSY PROSTATE TRANSRECTAL  05/07/2015     BIOPSY PROSTATE TRANSRECTAL  07/06/2016     CYSTOSCOPY, DILATE URETHRA, COMBINED N/A 5/11/2017    Procedure: COMBINED CYSTOSCOPY, DILATE URETHRA;  Cystoscopy, ballon dilation;  Surgeon: Ross Rust MD;  Location: [facility] OR     PROSTATECTOMY RETROPUBIC RADICAL  12/19/2016     SIGMOIDOSCOPY FLEXIBLE  7/15/2013    Procedure: SIGMOIDOSCOPY FLEXIBLE;  Sigmoidoscopy Flexible;  Surgeon: Dusty Chang MD;  Location:  GI     Family History   Problem Relation Age of Onset     DIABETES Maternal Grandmother      Hypertension Mother      Prostate Cancer Maternal Grandfather      C.A.D. No family hx of      Coronary  Artery Disease No family hx of      Breast Cancer No family hx of      Ovarian Cancer No family hx of      Mental Illness No family hx of      Asthma No family hx of      Obesity No family hx of      Unknown/Adopted No family hx of      Anesthesia Reaction No family hx of      CEREBROVASCULAR DISEASE No family hx of      Other Cancer No family hx of      Cancer - colorectal No family hx of      Hyperlipidemia No family hx of      Known Genetic Syndrome No family hx of      OSTEOPOROSIS No family hx of      Depression No family hx of      Anxiety Disorder No family hx of      MENTAL ILLNESS No family hx of      Substance Abuse No family hx of      Thyroid Disease No family hx of      Genetic Disorder No family hx of      Colon Cancer No family hx of      Social History     Social History     Marital status:      Spouse name: N/A     Number of children: 2     Years of education: N/A     Occupational History      [employer] [place]     Social History Main Topics     Smoking status: Never Smoker     Smokeless tobacco: Current User     Types: Chew      Comment: 1 tin every 3 days     Alcohol use 7.2 oz/week     12 Cans of beer, 0 Standard drinks or equivalent per week      Comment: 12 beers per week     Drug use: No     Sexual activity: Yes     Partners: Female     Birth control/ protection: None     Other Topics Concern     Parent/Sibling W/ Cabg, Mi Or Angioplasty Before 65f 55m? No      Service No     Blood Transfusions No     Caffeine Concern No     Occupational Exposure No     Hobby Hazards No     Sleep Concern No     Stress Concern No     Weight Concern No     Special Diet No     Back Care No     Exercise Yes     [employer] 12+ times per month     Seat Belt Yes     Self-Exams Yes     Social History Narrative       REVIEW OF SYSTEMS  =================  C: NEGATIVE for fever, chills, change in weight  I: NEGATIVE for worrisome rashes, moles or lesions  E/M: NEGATIVE for ear, mouth and throat  "problems  R: NEGATIVE for significant cough or SHORTNESS OF BREATH,   CV: NEGATIVE for chest pain, palpitations or peripheral edema  GI: NEGATIVE for nausea, abdominal pain, heartburn, or change in bowel habits  NEURO: NEGATIVE any motor/sensory changes  PSYCH: NEGATIVE for recent mood disorder    Physical Exam:  /88 (BP Location: Right arm, Patient Position: Chair, Cuff Size: Adult Large)  Resp 14  Ht 1.702 m (5' 7\")  Wt 98.4 kg (217 lb)  BMI 33.99 kg/m2   Patient is pleasant, in no acute distress, good general condition.  Lung: no evidence of respiratory distress    Abdomen: Soft, nondistended, non tender. No masses. No rebound or guarding.   Exam: urine clear.   No cva tenderness  Skin: Warm and dry.  No redness.  Psych: normal mood and affect  Neuro: alert and oriented    Assessment/Plan:   (N32.0) Bladder neck contracture  (primary encounter diagnosis)  Comment:    Plan: catheter removed today           F/u on Monday next week for recheck    HTN: Patient to follow up with Primary Care provider regarding elevated blood pressure.                    "

## 2018-04-23 ENCOUNTER — OFFICE VISIT (OUTPATIENT)
Dept: UROLOGY | Facility: CLINIC | Age: 58
End: 2018-04-23
Payer: COMMERCIAL

## 2018-04-23 VITALS — OXYGEN SATURATION: 98 % | HEART RATE: 77 BPM | SYSTOLIC BLOOD PRESSURE: 153 MMHG | DIASTOLIC BLOOD PRESSURE: 86 MMHG

## 2018-04-23 DIAGNOSIS — N32.0 BLADDER NECK CONTRACTURE: Primary | ICD-10-CM

## 2018-04-23 DIAGNOSIS — R03.0 ELEVATED BLOOD PRESSURE READING WITHOUT DIAGNOSIS OF HYPERTENSION: ICD-10-CM

## 2018-04-23 PROCEDURE — 51798 US URINE CAPACITY MEASURE: CPT | Performed by: UROLOGY

## 2018-04-23 PROCEDURE — 99213 OFFICE O/P EST LOW 20 MIN: CPT | Mod: 25 | Performed by: UROLOGY

## 2018-04-23 NOTE — MR AVS SNAPSHOT
After Visit Summary   4/23/2018    Estuardo Bowden    MRN: [id number]           Patient Information     Date Of Birth          1960        Visit Information        Provider Department      4/23/2018 9:15 AM Ross Rust MD [facility]        Today's Diagnoses     Bladder neck contracture    -  1    Elevated blood pressure reading without diagnosis of hypertension           Follow-ups after your visit        Your next 10 appointments already scheduled     May 21, 2018  1:00 PM CDT   Nurse Only with  UROLOGY [facility] ([address] [address]   [phone]            Jun 05, 2018  9:00 AM CDT   Return Visit with Shaun James MD   [facility] ([facility]    [address] [place] [id number] [phone]              Who to contact     If you have questions or need follow up information about today's clinic visit or your schedule please contact [facility] directly at [phone].  Normal or non-critical lab and imaging results will be communicated to you by Dragon Armyhart, letter or phone within 4 business days after the clinic has received the results. If you do not hear from us within 7 days, please contact the clinic through Dragon Armyhart or phone. If you have a critical or abnormal lab result, we will notify you by phone as soon as possible.  Submit refill requests through Think Gaming or call your pharmacy and they will forward the refill request to us. Please allow 3 business days for your refill to be completed.          Additional Information About Your Visit        Dragon Armyhart Information     Think Gaming gives you secure access to your electronic health record. If you see a primary care provider, you can also send messages to your care team and make appointments. If you have questions, please call your primary care clinic.  If you do not have a  primary care provider, please call [phone] and they will assist you.        Care EveryWhere ID     This is your Care EveryWhere ID. This could be used by other organizations to access your [place] medical records  [id number]        Your Vitals Were     Pulse Pulse Oximetry                77 98%           Blood Pressure from Last 3 Encounters:   04/23/18 153/86   04/19/18 154/88   04/03/18 136/86    Weight from Last 3 Encounters:   04/19/18 98.4 kg (217 lb)   12/28/17 95.3 kg (210 lb)   12/18/17 97.8 kg (215 lb 9.6 oz)              We Performed the Following     MEASURE POST-VOID RESIDUAL URINE/BLADDER CAPACITY, US NON-IMAGING ([address])        Primary Care Provider Office Phone # Fax #    Hernandez Luna [phone] [address] [facility] [address]        Equal Access to Services     [facility] : Hadii aad ku hadasho Soomaali, waaxda luqadaha, qaybta kaalmada adeegyada, waxay zenaidain hayjimmien aureliano davis . So [facility] [phone].    ATENCIÓN: Si habla español, tiene a sage disposición servicios gratuitos de asistencia lingüística. Llame al [phone].    We comply with applicable federal civil rights laws and [place] laws. We do not discriminate on the basis of race, color, national origin, age, disability, sex, sexual orientation, or gender identity.            Thank you!     Thank you for choosing [facility]  for your care. Our goal is always to provide you with excellent care. Hearing back from our patients is one way we can continue to improve our services. Please take a few minutes to complete the written survey that you may receive in the mail after your visit with us. Thank you!             Your Updated Medication List - Protect others around you: Learn how to safely use, store and throw away your medicines at www.disposemymeds.org.          This list is accurate as of 4/23/18  9:22 AM.  Always use your most recent med list.                   Brand  Name Dispense Instructions for use Diagnosis    calcium 1200 5139-8785 MG-UNIT Chew     30 tablet    Take 1 tablet by mouth daily    Malignant neoplasm of prostate (H)       IMODIUM A-D PO           leuprolide 45 MG kit    ELIGARD     Inject 45 mg Subcutaneous every 6 months        oxybutynin 10 MG 24 hr tablet    DITROPAN XL    90 tablet    Take 1 tablet (10 mg) by mouth daily    Urinary frequency       sildenafil 100 MG tablet    VIAGRA    6 tablet    Take 1 tablet (100 mg) by mouth daily as needed 30 min to 4 hrs before sex. Do not use with nitroglycerin, terazosin or doxazosin.    Male impotence

## 2018-04-23 NOTE — PROGRESS NOTES
Chief Complaint   Patient presents with     RECHECK       Estuardo Norberto Bowden is a 57 year old male who presents today for follow up of   Chief Complaint   Patient presents with     RECHECK    f/u post BNC dilation.  He is voiding well without any complaints.  He has some leakage.  He is emptying his bladder well.    Current Outpatient Prescriptions   Medication Sig Dispense Refill     Calcium Carbonate-Vit D-Min (CALCIUM 1200) 5102-0322 MG-UNIT CHEW Take 1 tablet by mouth daily 30 tablet      leuprolide (ELIGARD) 45 MG kit Inject 45 mg Subcutaneous every 6 months       Loperamide HCl (IMODIUM A-D PO)        oxybutynin (DITROPAN XL) 10 MG 24 hr tablet Take 1 tablet (10 mg) by mouth daily 90 tablet 1     sildenafil (VIAGRA) 100 MG tablet Take 1 tablet (100 mg) by mouth daily as needed 30 min to 4 hrs before sex. Do not use with nitroglycerin, terazosin or doxazosin. 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     Prostate cancer (H) 05/07/2015     Past Surgical History:   Procedure Laterality Date     ARTHROTOMY SHOULDER, ROTATOR CUFF REPAIR, COMBINED Right 10/7/2016    Right MICHELERMartine Mumford     BIOPSY PROSTATE TRANSRECTAL  05/07/2015     BIOPSY PROSTATE TRANSRECTAL  07/06/2016     CYSTOSCOPY, DILATE URETHRA, COMBINED N/A 5/11/2017    Procedure: COMBINED CYSTOSCOPY, DILATE URETHRA;  Cystoscopy, ballon dilation;  Surgeon: Ross Rust MD;  Location:  OR     PROSTATECTOMY RETROPUBIC RADICAL  12/19/2016     SIGMOIDOSCOPY FLEXIBLE  7/15/2013    Procedure: SIGMOIDOSCOPY FLEXIBLE;  Sigmoidoscopy Flexible;  Surgeon: Dusty Chang MD;  Location:  GI     Family History   Problem Relation Age of Onset     DIABETES Maternal Grandmother      Hypertension Mother      Prostate Cancer Maternal Grandfather      C.A.D. No family hx of      Coronary Artery  Disease No family hx of      Breast Cancer No family hx of      Ovarian Cancer No family hx of      Mental Illness No family hx of      Asthma No family hx of      Obesity No family hx of      Unknown/Adopted No family hx of      Anesthesia Reaction No family hx of      CEREBROVASCULAR DISEASE No family hx of      Other Cancer No family hx of      Cancer - colorectal No family hx of      Hyperlipidemia No family hx of      Known Genetic Syndrome No family hx of      OSTEOPOROSIS No family hx of      Depression No family hx of      Anxiety Disorder No family hx of      MENTAL ILLNESS No family hx of      Substance Abuse No family hx of      Thyroid Disease No family hx of      Genetic Disorder No family hx of      Colon Cancer No family hx of      Social History     Social History     Marital status:      Spouse name: N/A     Number of children: 2     Years of education: N/A     Occupational History      [employer] [place]     Social History Main Topics     Smoking status: Never Smoker     Smokeless tobacco: Current User     Types: Chew      Comment: 1 tin every 3 days     Alcohol use 7.2 oz/week     12 Cans of beer, 0 Standard drinks or equivalent per week      Comment: 12 beers per week     Drug use: No     Sexual activity: Yes     Partners: Female     Birth control/ protection: None     Other Topics Concern     Parent/Sibling W/ Cabg, Mi Or Angioplasty Before 65f 55m? No      Service No     Blood Transfusions No     Caffeine Concern No     Occupational Exposure No     Hobby Hazards No     Sleep Concern No     Stress Concern No     Weight Concern No     Special Diet No     Back Care No     Exercise Yes     [employer] 12+ times per month     Seat Belt Yes     Self-Exams Yes     Social History Narrative       REVIEW OF SYSTEMS  =================  C: NEGATIVE for fever, chills, change in weight  I: NEGATIVE for worrisome rashes, moles or lesions  E/M: NEGATIVE for ear, mouth and throat problems  R:  NEGATIVE for significant cough or SHORTNESS OF BREATH,   CV: NEGATIVE for chest pain, palpitations or peripheral edema  GI: NEGATIVE for nausea, abdominal pain, heartburn, or change in bowel habits  NEURO: NEGATIVE any motor/sensory changes  PSYCH: NEGATIVE for recent mood disorder    Physical Exam:  /86 (BP Location: Right arm, Patient Position: Chair, Cuff Size: Adult Large)  Pulse 77  SpO2 98%   Patient is pleasant, in no acute distress, good general condition.  Lung: no evidence of respiratory distress    Abdomen: Soft, nondistended, non tender. No masses. No rebound or guarding.   Exam: no cva tenderness.  Bladder scan 17 ml  Skin: Warm and dry.  No redness.  Psych: normal mood and affect  Neuro: alert and oriented    Assessment/Plan:   (N32.0) Bladder neck contracture  (primary encounter diagnosis)  Comment:    Plan:  Recheck flow/ru in one month    Elevated bp:  Patient to follow up with Primary Care provider regarding elevated blood pressure.

## 2018-05-21 ENCOUNTER — ALLIED HEALTH/NURSE VISIT (OUTPATIENT)
Dept: UROLOGY | Facility: CLINIC | Age: 58
End: 2018-05-21
Payer: COMMERCIAL

## 2018-05-21 DIAGNOSIS — N35.919 URETHRAL STRICTURE: Primary | ICD-10-CM

## 2018-05-21 PROCEDURE — 51798 US URINE CAPACITY MEASURE: CPT

## 2018-05-21 PROCEDURE — 51741 ELECTRO-UROFLOWMETRY FIRST: CPT

## 2018-05-21 NOTE — PROGRESS NOTES
Patient arrives for uroflow/ RU.   Patient completed uroflow.   Bladder Scan performed. 6 maximum residual urine detected after 3 scans. MD isha Appiah RN

## 2018-05-21 NOTE — MR AVS SNAPSHOT
After Visit Summary   5/21/2018    Estuardo Bowden    MRN: [id number]           Patient Information     Date Of Birth          1960        Visit Information        Provider Department      5/21/2018 1:00 PM [facility] UROLOGY [facility]        Today's Diagnoses     Urethral stricture    -  1       Follow-ups after your visit        Your next 10 appointments already scheduled     Jun 05, 2018  9:00 AM CDT   [facility]    [address] [place] [id number] [phone]              Who to contact     If you have questions or need follow up information about today's clinic visit or your schedule please contact [facility] directly at [phone].  Normal or non-critical lab and imaging results will be communicated to you by MyChart, letter or phone within 4 business days after the clinic has received the results. If you do not hear from us within 7 days, please contact the clinic through MyChart or phone. If you have a critical or abnormal lab result, we will notify you by phone as soon as possible.  Submit refill requests through [employer] or call your pharmacy and they will forward the refill request to us. Please allow 3 business days for your refill to be completed.          Additional Information About Your Visit        MyChart Information     [employer] gives you secure access to your electronic health record. If you see a primary care provider, you can also send messages to your care team and make appointments. If you have questions, please call your primary care clinic.  If you do not have a primary care provider, please call [phone] and they will assist you.        Care EveryWhere ID     This is your Care EveryWhere ID. This could be used by other organizations to access your [place] medical records  [id number]         Blood Pressure from Last 3 Encounters:    04/23/18 153/86   04/19/18 154/88   04/03/18 136/86    Weight from Last 3 Encounters:   04/19/18 98.4 kg (217 lb)   12/28/17 95.3 kg (210 lb)   12/18/17 97.8 kg (215 lb 9.6 oz)              We Performed the Following     C COMPLEX UROFLOWMETRY     MEASURE POST-VOID RESIDUAL URINE/BLADDER CAPACITY, US NON-IMAGING        Primary Care Provider Office Phone # Fax #    Hernandez Elvin Luna [phone] [address] [facility] [address]        Equal Access to Services     [facility] : Hadii bob cross hadasho Somarcelo, waaxda luqadaha, qaybta kaalmada adelarisayalisette, lance davis . So [facility] [phone].    ATENCIÓN: Si habla español, tiene a sage disposición servicios gratuitos de asistencia lingüística. LlUniversity [facility]als [facility] System [phone].    We comply with applicable federal civil rights laws and [place] laws. We do not discriminate on the basis of race, color, national origin, age, disability, sex, sexual orientation, or gender identity.            Thank you!     Thank you for choosing [facility]  for your care. Our goal is always to provide you with excellent care. Hearing back from our patients is one way we can continue to improve our services. Please take a few minutes to complete the written survey that you may receive in the mail after your visit with us. Thank you!             Your Updated Medication List - Protect others around you: Learn how to safely use, store and throw away your medicines at www.disposemymeds.org.          This list is accurate as of 5/21/18  1:17 PM.  Always use your most recent med list.                   Brand Name Dispense Instructions for use Diagnosis    calcium 1200 7998-5240 MG-UNIT Chew     30 tablet    Take 1 tablet by mouth daily    Malignant neoplasm of prostate (H)       IMODIUM A-D PO           leuprolide 45 MG kit    ELIGARD     Inject 45 mg Subcutaneous every 6 months        oxybutynin 10 MG 24 hr tablet    DITROPAN XL    90  tablet    Take 1 tablet (10 mg) by mouth daily    Urinary frequency       sildenafil 100 MG tablet    VIAGRA    6 tablet    Take 1 tablet (100 mg) by mouth daily as needed 30 min to 4 hrs before sex. Do not use with nitroglycerin, terazosin or doxazosin.    Male impotence

## 2018-06-01 NOTE — PROGRESS NOTES
"RADIATION ONCOLOGY FOLLOW-UP VISIT  DATE: Jun 5, 2018    NAME: Estuardo Bowden  MRN: [id number]    DISEASE TREATED: prostate cancer s/p prostatectomy, GS 4+4, pT3bN0, neg margins, PSA 0.28 postop (iPSA 20.9)    RADIATION THERAPY DELIVERED: Salvage RT to 70 Gy with long term ADT    INTERVAL SINCE COMPLETION OF RT: 9 months since completion on 8/29/17    SUBJECTIVE:  Mr. Bowden is a 58 year old gentleman who completed radiation therapy for high risk prostate cancer per above. Hereturns to our clinic today for a routine follow-up. Since last being seen he had a DVT with bilateral PE in December requiring hospitalization at [facility]. He has been on Xarelto since. His last PSA screening was in February and undetectable at that time. He had another Eligard injection 3/7/18. He is tolerating it well with mild hot flashes occasionally. He is due for his next shot in September. Last PSA in 2/20/18 was <0.01. He stopped taking oxybutynin.    He saw Dr. Rust 4/3/18 for urinary slowing and required a balloon dilation for bladder neck contracture at that time. His urinary symptoms today are primarily frequency, emptying and urgency and his AUA score is 22 compared to 28 at last visit. His urinary function improved after his dilation procedure. His biggest concern is urine leakage, especially when he gets up at work - he uses 2 pads a day. He is doing Kegel exercises. He continues to deny change in his bowel movements or blood in his stool. His sexual health inventory score is 1/25. His energy level has recovered a little bit, and he is still working full-time. He does endorse a lot of stress at work, which causes him fatigue at the end of the day.     PHYSICAL EXAM:  VITALS: /87 (BP Location: Left arm, Patient Position: Chair, Cuff Size: Adult Regular)  Pulse 82  Resp 14  Ht 5' 7\"  Wt 221 lb 14.4 oz  SpO2 97%  BMI 34.75 kg/m2  GEN: Appears well, alert, oriented, and in NAD  HEENT: EOMI, normal " conjunctiva  RESP: breathing comfortably on room air  RECTAL: deferred given controlled PSA  SKIN: Normal color and turgor  NEURO: No focal deficits, normal gait  PSYCH: Appropriate mood and affect    LABS AND IMAGING: Reviewed    PSA   Date Value Ref Range Status   02/20/2018 <0.01 0 - 4 ug/L Final   11/29/2017 <0.01 0 - 4 ug/L Final   04/12/2017 0.28 0 - 4 ug/L Final   10/27/2016 20.90 (H) 0 - 4 ug/L Final   06/15/2016 15.69 (H) 0 - 4 ug/L Final   05/18/2016 16.62 (H) 0 - 4 ug/L Final   11/17/2015 11.40 (H) 0 - 4 ug/L Final   04/22/2015 11.23 (H) 0 - 4 ug/L Final     Lab Results   Component Value Date    WBC 6.3 04/03/2018    HGB 14.0 04/03/2018    HCT 41.3 04/03/2018    MCV 98 04/03/2018     04/03/2018     Lab Results   Component Value Date     04/03/2018    POTASSIUM 3.8 04/05/2018    CHLORIDE 106 04/03/2018    CO2 28 04/03/2018    GLC 99 04/05/2018     IMPRESSION:   Mr. Bowden is a 58 year old male with high risk prostate cancer status post prostatectomy and salvage radiation. He is doing well with no biochemical evidence of disease. However, he is having significant urinary symptoms and a bladder neck contracture s/p dilation.    PLAN:  1. RTC in 6 months for follow up, with repeat PSA in August  2. Continue calcium and vitamin D supplementation while on hormonal therapy.   3. Continue hormonal therapy and follow up with Dr. Rust as instructed. We had a discussion regarding the length of hormonal therapy; he was initially planned for 2 yrs of hormonal therapy, and I discussed that there is not strong evidence regarding the length of ADT in this setting and it may be reasonable to shorten the duration, particularly if he is having poor tolerance. He stated he is tolerating it well overall besides hot flashes. As Dr. Rust has been managing his ADT, I recommended he discuss the total duration with him as well.  4. Encouraged to continue Kegel exercises. Also discussed artificial sphincter, and  suggested he discuss with Dr. Rust if interested.     Mr. Bowden was seen and discussed with staff, Dr. James.    Gosia Reardon MD  PGY-2 Radiation Oncology Resident  [facility]  Clinic: [phone]    Attending Physician Attestation:  I saw and evaluated the patient. I reviewed the resident's note and edited it as needed, and I agree with the plan of care as documented.    Shaun James M.D.  Attending Physician  Radiation Oncology  Clinic Phone: [phone]  Pager #: [phone]

## 2018-06-05 ENCOUNTER — OFFICE VISIT (OUTPATIENT)
Dept: RADIATION ONCOLOGY | Facility: CLINIC | Age: 58
End: 2018-06-05
Payer: COMMERCIAL

## 2018-06-05 VITALS
HEART RATE: 82 BPM | SYSTOLIC BLOOD PRESSURE: 132 MMHG | HEIGHT: 67 IN | BODY MASS INDEX: 34.83 KG/M2 | OXYGEN SATURATION: 97 % | WEIGHT: 221.9 LBS | RESPIRATION RATE: 14 BRPM | DIASTOLIC BLOOD PRESSURE: 87 MMHG

## 2018-06-05 DIAGNOSIS — C61 MALIGNANT NEOPLASM OF PROSTATE (H): Primary | ICD-10-CM

## 2018-06-05 PROCEDURE — 99214 OFFICE O/P EST MOD 30 MIN: CPT | Performed by: STUDENT IN AN ORGANIZED HEALTH CARE EDUCATION/TRAINING PROGRAM

## 2018-06-05 ASSESSMENT — PAIN SCALES - GENERAL: PAINLEVEL: NO PAIN (0)

## 2018-06-05 NOTE — PATIENT INSTRUCTIONS
Please complete PSA in August 2018  Follow up with Dr James on 12/11/18 9 am    Please contact [facility] RN with questions or concerns following today's appointment: [phone].    Thank you!

## 2018-06-05 NOTE — NURSING NOTE
"FOLLOW-UP VISIT    Patient Name: Estuardo Bowden      : 1960     Age: 58 year old        ______________________________________________________________________________     Chief Complaint   Patient presents with     RECHECK     Prostate cancer follow up with Dr James with PSA     /87 (BP Location: Left arm, Patient Position: Chair, Cuff Size: Adult Regular)  Pulse 82  Resp 14  Ht 5' 7\"  Wt 221 lb 14.4 oz  SpO2 97%  BMI 34.75 kg/m2     Date Radiation Completed: 17    Pain  Denies    Labs  Other Labs: Yes: PSA    Imaging  None        PSA:   PSA   Date Value Ref Range Status   2018 <0.01 0 - 4 ug/L Final     Comment:     Assay Method:  Chemiluminescence using Siemens Vista analyzer   2017 <0.01 0 - 4 ug/L Final     Comment:     Assay Method:  Chemiluminescence using Siemens Vista analyzer   2017 0.28 0 - 4 ug/L Final     Comment:     Assay Method:  Chemiluminescence using Siemens Vista analyzer   10/27/2016 20.90 (H) 0 - 4 ug/L Final     Comment:     Assay Method:  Chemiluminescence using Siemens Vista analyzer   06/15/2016 15.69 (H) 0 - 4 ug/L Final   2016 16.62 (H) 0 - 4 ug/L Final   2015 11.40 (H) 0 - 4 ug/L Final   2015 11.23 (H) 0 - 4 ug/L Final   2010  0 - 4 ug/L Corrected    Incorrectly ordered by PCU/Clinic   Test reordered under corrected code.  CORRECTED ON  AT 1703: PREVIOUSLY REPORTED AS 4.29   2010 4.29 (H) 0 - 4 ug/L Final     Testosterone Level: No results found for: TESTOSTTOTAL    On-Study AUA Symptom Score (PQ)  AUA (American Urological Association) Symptom Score  1. Over the past month, how often have you had a sensation of not emptying your bladder completely after you finished urinating?: Almost always  2. Over the past month, how often have you had to urinate again less than two hours after you finished urinating?: Almost always  3. Over the past month, how often have you found you stopped and started again several " times when you urinated?: Less than 1 time in 5  4. Over the past month, how often have you found it difficult to postpone urination?: Almost always  5. Over the past month, how often have you had a weak urine stream?: About half the time  6. Over the past month, how often have you had to push or strain to begin urinating?: Less than 1 time in 5  7. Over the past month, how many times did you typically get up to urinate from the time you went to bed at night until the time you got up in the morning?: 2 times  The Disease-specific Quality of Life Question: If you were to spend the rest of your life with your urinary condition just the way it is now, how would you feel about that? (highlight or underline): Unhappy  AUA Score: 22    Diarrhea: 0- None    Constipation: 0- None      Other Appointments:     MD Name:  Appointment Date:    MD Name: Appointment Date:   MD Name: Appointment Date:   Other Appointment Notes:     Residual Radiation side effect: Patient reports no pain at this current visit. Patient states he has occasional dysuria. Patient reports urinary incontinence and uses 2 urinary pads per day. Patient reports urinary frequency, urgency. Patient states he has weak urinary stream and difficulty emptying his bladder. Patient reports nocturia times 2. Patient denies any constipation and diarrhea. Patient reports 1 to 2 semi soft bowel movements daily. Patient states he is no longer taking oxybutynin and imodium       Additional Instructions:     Nurse face-to-face time: Level 2:  5 min face to face time    Terry FLOR

## 2018-06-05 NOTE — MR AVS SNAPSHOT
After Visit Summary   6/5/2018    Estuardo Bowden    MRN: [id number]           Patient Information     Date Of Birth          1960        Visit Information        Provider Department      6/5/2018 9:00 AM Shaun James MD [facility]        Today's Diagnoses     Malignant neoplasm of prostate (H)    -  1      Care Instructions    Please complete PSA in August 2018  Follow up with Dr James on 12/11/18 9 am    Please contact [facility] RN with questions or concerns following today's appointment: [phone].    Thank you!            Follow-ups after your visit        Your next 10 appointments already scheduled     Dec 11, 2018  9:00 AM CST   Return Visit with Shaun James MD   [facility]    [id number] [place] [address] [phone]              Who to contact     If you have questions or need follow up information about today's clinic visit or your schedule please contact [facility] directly at [phone].  Normal or non-critical lab and imaging results will be communicated to you by Celsi[employer]art, letter or phone within 4 business days after the clinic has received the results. If you do not hear from us within 7 days, please contact the clinic through [employer] or phone. If you have a critical or abnormal lab result, we will notify you by phone as soon as possible.  Submit refill requests through [employer] or call your pharmacy and they will forward the refill request to us. Please allow 3 business days for your refill to be completed.          Additional Information About Your Visit        Celsi[employer]art Information     [employer] gives you secure access to your electronic health record. If you see a primary care provider, you can also send messages to your care team and make appointments. If you have questions, please call your primary care clinic.  If you do not have a  "primary care provider, please call [phone] and they will assist you.      [email or web address] is an electronic gateway that provides easy, online access to your medical records. With [email or web address], you can request a clinic appointment, read your test results, renew a prescription or communicate with your care team.     To access your existing account, please contact your [facility] or call [phone] for assistance.        Care EveryWhere ID     This is your Care EveryWhere ID. This could be used by other organizations to access your [place] medical records  [id number]        Your Vitals Were     Pulse Respirations Height Pulse Oximetry BMI (Body Mass Index)       82 14 5' 7\" 97% 34.75 kg/m2        Blood Pressure from Last 3 Encounters:   06/05/18 132/87   04/23/18 153/86   04/19/18 154/88    Weight from Last 3 Encounters:   06/05/18 221 lb 14.4 oz   04/19/18 217 lb   12/28/17 210 lb              Today, you had the following     No orders found for display       Primary Care Provider Office Phone # Fax #    Hernandez Luna [phone] [address] [facility] [address]        Equal Access to Services     PARTHA VALADEZ AH: Hadii bob cross hadasho Soomaali, waaxda luqadaha, qaybta kaalmada adeegyada, lance fish. So [facility] [phone].    ATENCIÓN: Si habla español, tiene a sage disposición servicios gratuitos de asistencia lingüística. Llame al [phone].    We comply with applicable federal civil rights laws and [place] laws. We do not discriminate on the basis of race, color, national origin, age, disability, sex, sexual orientation, or gender identity.            Thank you!     Thank you for choosing [facility]  for your care. Our goal is always to provide you with excellent care. Hearing back from our patients is one way we can continue to improve our services. Please take a few minutes to complete the written survey " that you may receive in the mail after your visit with us. Thank you!             Your Updated Medication List - Protect others around you: Learn how to safely use, store and throw away your medicines at www.disposemymeds.org.          This list is accurate as of 6/5/18  9:19 AM.  Always use your most recent med list.                   Brand Name Dispense Instructions for use Diagnosis    calcium 1200 6839-0060 MG-UNIT Chew     30 tablet    Take 1 tablet by mouth daily    Malignant neoplasm of prostate (H)       IMODIUM A-D PO           leuprolide 45 MG kit    ELIGARD     Inject 45 mg Subcutaneous every 6 months        oxybutynin 10 MG 24 hr tablet    DITROPAN XL    90 tablet    Take 1 tablet (10 mg) by mouth daily    Urinary frequency       sildenafil 100 MG tablet    VIAGRA    6 tablet    Take 1 tablet (100 mg) by mouth daily as needed 30 min to 4 hrs before sex. Do not use with nitroglycerin, terazosin or doxazosin.    Male impotence

## 2018-09-05 DIAGNOSIS — C61 PROSTATE CANCER (H): ICD-10-CM

## 2018-09-05 LAB — PSA SERPL-MCNC: <0.01 UG/L (ref 0–4)

## 2018-09-05 PROCEDURE — 84153 ASSAY OF PSA TOTAL: CPT | Performed by: UROLOGY

## 2018-09-05 PROCEDURE — 36415 COLL VENOUS BLD VENIPUNCTURE: CPT | Performed by: UROLOGY

## 2018-09-16 ENCOUNTER — HEALTH MAINTENANCE LETTER (OUTPATIENT)
Age: 58
End: 2018-09-16

## 2018-09-27 ENCOUNTER — OFFICE VISIT (OUTPATIENT)
Dept: UROLOGY | Facility: OTHER | Age: 58
End: 2018-09-27
Payer: COMMERCIAL

## 2018-09-27 VITALS
HEIGHT: 67 IN | OXYGEN SATURATION: 98 % | DIASTOLIC BLOOD PRESSURE: 85 MMHG | SYSTOLIC BLOOD PRESSURE: 150 MMHG | WEIGHT: 220 LBS | HEART RATE: 82 BPM | BODY MASS INDEX: 34.53 KG/M2

## 2018-09-27 DIAGNOSIS — C61 PROSTATE CANCER (H): Primary | ICD-10-CM

## 2018-09-27 DIAGNOSIS — N32.0 BLADDER NECK CONTRACTURE: ICD-10-CM

## 2018-09-27 DIAGNOSIS — N39.46 MIXED INCONTINENCE URGE AND STRESS (MALE)(FEMALE): ICD-10-CM

## 2018-09-27 DIAGNOSIS — R03.0 ELEVATED BLOOD PRESSURE READING WITHOUT DIAGNOSIS OF HYPERTENSION: ICD-10-CM

## 2018-09-27 PROCEDURE — 96402 CHEMO HORMON ANTINEOPL SQ/IM: CPT | Performed by: UROLOGY

## 2018-09-27 PROCEDURE — 99214 OFFICE O/P EST MOD 30 MIN: CPT | Mod: 25 | Performed by: UROLOGY

## 2018-09-27 NOTE — MR AVS SNAPSHOT
After Visit Summary   9/27/2018    Estuardo Bowden    MRN: [id number]           Patient Information     Date Of Birth          1960        Visit Information        Provider Department      9/27/2018 9:15 AM Ross Rust MD [facility]        Today's Diagnoses     Prostate cancer (H)    -  1    Mixed incontinence urge and stress (male)(female)        Bladder neck contracture        Male impotence        Elevated blood pressure reading without diagnosis of hypertension           Follow-ups after your visit        Your next 10 appointments already scheduled     Dec 14, 2018  8:00 AM CST   Return Visit with Shaun James MD   [facility] ([facility])    [address] [place] [id number] [phone]              Who to contact     If you have questions or need follow up information about today's clinic visit or your schedule please contact [facility] directly at [phone].  Normal or non-critical lab and imaging results will be communicated to you by Attention Pointhart, letter or phone within 4 business days after the clinic has received the results. If you do not hear from us within 7 days, please contact the clinic through PolyMedixt or phone. If you have a critical or abnormal lab result, we will notify you by phone as soon as possible.  Submit refill requests through Woto or call your pharmacy and they will forward the refill request to us. Please allow 3 business days for your refill to be completed.          Additional Information About Your Visit        MyChart Information     Woto gives you secure access to your electronic health record. If you see a primary care provider, you can also send messages to your care team and make appointments. If you have questions, please call your primary care clinic.  If you do not have a primary care provider, please call [phone] and they will assist you.       "  Care EveryWhere ID     This is your Care EveryWhere ID. This could be used by other organizations to access your [facility] medical records  [id number]        Your Vitals Were     Pulse Height Pulse Oximetry BMI (Body Mass Index)          82 1.702 m (5' 7\") 98% 34.46 kg/m2         Blood Pressure from Last 3 Encounters:   09/27/18 150/85   06/05/18 132/87   04/23/18 153/86    Weight from Last 3 Encounters:   09/27/18 99.8 kg (220 lb)   06/05/18 100.7 kg (221 lb 14.4 oz)   04/19/18 98.4 kg (217 lb)              We Performed the Following     INJECTION INTRAMUSCULAR OR SUB-Q [[id number]]        Primary Care Provider Office Phone # Fax #    Hernandez Luna [phone] [address] [facility] [id number]        Equal Access to Services     [facility]: Hadii aad ku hadasho Soomaali, waaxda luqadaha, qaybta kaalmada adeegyada, waxay zenaidain hayjimmien aureliano davis . So [facility] [phone].    ATENCIÓN: Si habla español, tiene a sage disposición servicios gratuitos de asistencia lingüística. Priyank al [phone].    We comply with applicable federal civil rights laws and [place] laws. We do not discriminate on the basis of race, color, national origin, age, disability, sex, sexual orientation, or gender identity.            Thank you!     Thank you for choosing [facility]  for your care. Our goal is always to provide you with excellent care. Hearing back from our patients is one way we can continue to improve our services. Please take a few minutes to complete the written survey that you may receive in the mail after your visit with us. Thank you!             Your Updated Medication List - Protect others around you: Learn how to safely use, store and throw away your medicines at www.disposemymeds.org.          This list is accurate as of 9/27/18 11:59 PM.  Always use your most recent med list.                   Brand Name Dispense Instructions for use Diagnosis    calcium 1200 " 3263-1071 MG-UNIT Chew     30 tablet    Take 1 tablet by mouth daily    Malignant neoplasm of prostate (H)       IMODIUM A-D PO           leuprolide 45 MG kit    ELIGARD     Inject 45 mg Subcutaneous every 6 months        oxybutynin 10 MG 24 hr tablet    DITROPAN XL    90 tablet    Take 1 tablet (10 mg) by mouth daily    Urinary frequency       sildenafil 100 MG tablet    VIAGRA    6 tablet    Take 1 tablet (100 mg) by mouth daily as needed 30 min to 4 hrs before sex. Do not use with nitroglycerin, terazosin or doxazosin.    Male impotence

## 2018-09-27 NOTE — PROGRESS NOTES
Chief Complaint   Patient presents with     RECHECK     follow up prostate       Estuardo Norberto Bowden is a 58 year old male who presents today for follow up of   Chief Complaint   Patient presents with     RECHECK     follow up prostate    f/u for prostate cancer s/p RRP / XRT for group 4 cancer.  XRT was finished on 2017.  He has been on hormonal therapy for one year.  His energy level is good.  He has no side effects from eligard.  His urinary flow is good.  He is s/p bladder neck dilation several months ago.  He still has some mild stress incontinence.  He has no erections.   Viagra did not help.  Recent psa was < 0.01    Current Outpatient Prescriptions   Medication Sig Dispense Refill     Calcium Carbonate-Vit D-Min (CALCIUM 1200) 7257-1024 MG-UNIT CHEW Take 1 tablet by mouth daily 30 tablet      leuprolide (ELIGARD) 45 MG kit Inject 45 mg Subcutaneous every 6 months 1 each 0     leuprolide (ELIGARD) 45 MG kit Inject 45 mg Subcutaneous every 6 months       Loperamide HCl (IMODIUM A-D PO)        oxybutynin (DITROPAN XL) 10 MG 24 hr tablet Take 1 tablet (10 mg) by mouth daily (Patient not taking: Reported on 9/27/2018) 90 tablet 1     sildenafil (VIAGRA) 100 MG tablet Take 1 tablet (100 mg) by mouth daily as needed 30 min to 4 hrs before sex. Do not use with nitroglycerin, terazosin or doxazosin. 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     Prostate cancer (H) 05/07/2015     Past Surgical History:   Procedure Laterality Date     ARTHROTOMY SHOULDER, ROTATOR CUFF REPAIR, COMBINED Right 10/7/2016    Right RCRMartine Mumford     BIOPSY PROSTATE TRANSRECTAL  05/07/2015     BIOPSY PROSTATE TRANSRECTAL  07/06/2016     CYSTOSCOPY, DILATE URETHRA, COMBINED N/A 5/11/2017    Procedure: COMBINED CYSTOSCOPY, DILATE URETHRA;  Cystoscopy, ballon dilation;  Surgeon:  Ross Rust MD;  Location: [facility] OR     PROSTATECTOMY RETROPUBIC RADICAL  12/19/2016     SIGMOIDOSCOPY FLEXIBLE  7/15/2013    Procedure: SIGMOIDOSCOPY FLEXIBLE;  Sigmoidoscopy Flexible;  Surgeon: Dusty Chang MD;  Location: [facility] GI     Family History   Problem Relation Age of Onset     Diabetes Maternal Grandmother      Hypertension Mother      Prostate Cancer Maternal Grandfather      C.A.D. No family hx of      Coronary Artery Disease No family hx of      Breast Cancer No family hx of      Ovarian Cancer No family hx of      Mental Illness No family hx of      Asthma No family hx of      Obesity No family hx of      Unknown/Adopted No family hx of      Anesthesia Reaction No family hx of      Cerebrovascular Disease No family hx of      Other Cancer No family hx of      Cancer - colorectal No family hx of      Hyperlipidemia No family hx of      Known Genetic Syndrome No family hx of      Osteoporosis No family hx of      Depression No family hx of      Anxiety Disorder No family hx of      Mental Illness No family hx of      Substance Abuse No family hx of      Thyroid Disease No family hx of      Genetic Disorder No family hx of      Colon Cancer No family hx of      Social History     Social History     Marital status:      Spouse name: N/A     Number of children: 2     Years of education: N/A     Occupational History      [employer]     Social History Main Topics     Smoking status: Never Smoker     Smokeless tobacco: Current User     Types: Chew      Comment: 1 tin every 3 days     Alcohol use 7.2 oz/week     12 Cans of beer, 0 Standard drinks or equivalent per week      Comment: 12 beers per week     Drug use: No     Sexual activity: Yes     Partners: Female     Birth control/ protection: None     Other Topics Concern     Parent/Sibling W/ Cabg, Mi Or Angioplasty Before 65f 55m? No      Service No     Blood Transfusions No     Caffeine Concern No     Occupational  "Exposure No     Hobby Hazards No     Sleep Concern No     Stress Concern No     Weight Concern No     Special Diet No     Back Care No     Exercise Yes     [employer] 12+ times per month     Seat Belt Yes     Self-Exams Yes     Social History Narrative       REVIEW OF SYSTEMS  =================  C: NEGATIVE for fever, chills, change in weight  I: NEGATIVE for worrisome rashes, moles or lesions  E/M: NEGATIVE for ear, mouth and throat problems  R: NEGATIVE for significant cough or SHORTNESS OF BREATH,   CV: NEGATIVE for chest pain, palpitations or peripheral edema  GI: NEGATIVE for nausea, abdominal pain, heartburn, or change in bowel habits  NEURO: NEGATIVE any motor/sensory changes  PSYCH: NEGATIVE for recent mood disorder    Physical Exam:  /85 (BP Location: Left arm, Patient Position: Chair, Cuff Size: Adult Large)  Pulse 82  Ht 1.702 m (5' 7\")  Wt 99.8 kg (220 lb)  SpO2 98%  BMI 34.46 kg/m2   Patient is pleasant, in no acute distress, good general condition.  Lung: no evidence of respiratory distress    Abdomen: Soft, nondistended, non tender. No masses. No rebound or guarding.   Exam: no cva tenderness  Skin: Warm and dry.  No redness.  Psych: normal mood and affect  Neuro: alert and oriented    Assessment/Plan:   (C61) Prostate cancer (H)  (primary encounter diagnosis)  Comment: discussed pros and cons of cont hormonal therapy.  Plan:  He elects cont with hormonal therapy for one more year as planned.          nathalyd given today          Repeat psa in six months    (N39.46) Mixed incontinence urge and stress (male)(female)  Comment:    Plan: might need AUS but I would hold off for now until BNC would not come back.    (N32.0) Bladder neck contracture  Comment: s/p BNC  Plan: prn    (N52.9) Male impotence  Comment:    Plan: discussed penile injection/penile prosthesis    (R03.0) Elevated blood pressure reading without diagnosis of hypertension  Comment:    Plan: Patient to follow up with Primary Care " provider regarding elevated blood pressure.

## 2018-10-24 ENCOUNTER — TELEPHONE (OUTPATIENT)
Dept: RADIATION ONCOLOGY | Facility: CLINIC | Age: 58
End: 2018-10-24

## 2018-10-24 NOTE — TELEPHONE ENCOUNTER
I left a voicemail for Estuardo on 10/24/2018 at 0912, I am attempting to contact patient to reschedule appointment on 12/14/2018 at 0800 with Dr. James at [facility].  Dr. James will not be available that time/day and has asked that I contact the patient to reschedule.      Luciana Hitchcock MA

## 2018-10-24 NOTE — TELEPHONE ENCOUNTER
Patient contacted to reschedule appointment on 12/14/2018 at 0900 with Dr. James at [facility].  Dr. James will not be available that time/day and has asked that I contact the patient to reschedule.  Patient rescheduled for 12/14/2018 at 1400 with Dr. James, patient repeated information back to me and has no questions at this time.        Luciana Hitchcock MA

## 2018-12-12 ENCOUNTER — TELEPHONE (OUTPATIENT)
Dept: RADIATION ONCOLOGY | Facility: CLINIC | Age: 58
End: 2018-12-12

## 2018-12-12 NOTE — TELEPHONE ENCOUNTER
Attempted to contact patient for a reminder call regarding their appointment with Dr. James on 12/14/2018 at 1400 at [facility].  Voicemail was left with appointment time, date and contact information for rescheduling if needed.  Requested that patient return call to confirm appointment date and time to [phone].        Luciana Hitchcock MA

## 2018-12-12 NOTE — PROGRESS NOTES
RADIATION ONCOLOGY FOLLOW-UP VISIT  DATE: Dec 14, 2018    NAME: Estuardo Bowden  MRN: [id number]    DISEASE TREATED: prostate cancer s/p prostatectomy, GS 4+4, pT3bN0, neg margins, PSA 0.28 postop (iPSA 20.9)    RADIATION THERAPY DELIVERED: Salvage RT to 70 Gy with long term ADT    INTERVAL SINCE COMPLETION OF RT: 1 yr 4 months since completion on 8/29/17    SUBJECTIVE:  Mr. Bowden is a 58 year old gentleman who completed radiation therapy for high risk prostate cancer per above. He returns to our clinic today for a routine follow-up. He had a bladder neck dilation in 4/2018 but has been doing well since. PSA on 9/5/18 was <0.01. He had another Eligard injection 9/27/18. He is tolerating it well with mild hot flashes occasionally. His AUA score is 14 today, compared to 22 at last visit. He continues having mild incontinence and wears 2 pads/day; still doing Kegel exercises. He continues to deny change in his bowel movements or blood in his stool. His sexual health inventory score is 1/25. He is interested in trying interventions for his ED. His energy level is adequate, and he is still working full-time. He does endorse a lot of stress at work.    PHYSICAL EXAM:  VITALS: /84 (BP Location: Left arm, Patient Position: Sitting, Cuff Size: Adult Large)   Pulse 79   Temp 98.4  F (36.9  C) (Oral)   Resp 18   Wt 101.7 kg (224 lb 3.2 oz)   SpO2 96%   BMI 35.11 kg/m    GEN: Appears well, alert, oriented, and in NAD  HEENT: EOMI, normal conjunctiva  RESP: breathing comfortably on room air  RECTAL: deferred given controlled PSA  SKIN: Normal color and turgor  NEURO: No focal deficits, normal gait  PSYCH: Appropriate mood and affect    LABS AND IMAGING: Reviewed    PSA   Date Value Ref Range Status   09/05/2018 <0.01 0 - 4 ug/L Final     Comment:     Assay Method:  Chemiluminescence using Siemens Vista analyzer       IMPRESSION/PLAN:   Mr. Bowden is a 58 year old male with high risk prostate cancer status post  prostatectomy and salvage radiation. He is doing well with no biochemical evidence of recurrent disease; still on ADT. He will have one more lupron injection in ~3 mos. He will follow up with Dr. Rust. He will return to clinic here in 6 months for follow up, with repeat PSA if not done around that time. Continue calcium and vitamin D supplementation while on hormonal therapy. He was interested in trying shockwave therapy for ED, after seeing an advertisement for it. I am not familiar with this procedure, but I said he may certainly call them to discuss.    Shaun James M.D.  Attending Physician  Radiation Oncology  Clinic Phone: [phone]  Pager #: [phone]

## 2018-12-14 ENCOUNTER — OFFICE VISIT (OUTPATIENT)
Dept: RADIATION ONCOLOGY | Facility: CLINIC | Age: 58
End: 2018-12-14
Payer: COMMERCIAL

## 2018-12-14 VITALS
RESPIRATION RATE: 18 BRPM | TEMPERATURE: 98.4 F | BODY MASS INDEX: 35.11 KG/M2 | SYSTOLIC BLOOD PRESSURE: 149 MMHG | WEIGHT: 224.2 LBS | OXYGEN SATURATION: 96 % | HEART RATE: 79 BPM | DIASTOLIC BLOOD PRESSURE: 84 MMHG

## 2018-12-14 DIAGNOSIS — C61 MALIGNANT NEOPLASM OF PROSTATE (H): Primary | ICD-10-CM

## 2018-12-14 PROCEDURE — 99213 OFFICE O/P EST LOW 20 MIN: CPT | Performed by: RADIOLOGY

## 2018-12-14 ASSESSMENT — PAIN SCALES - GENERAL: PAINLEVEL: NO PAIN (0)

## 2018-12-14 NOTE — PATIENT INSTRUCTIONS
Please contact [facility] RN with questions or concerns following today's appointment: [phone].    Thank you!

## 2018-12-14 NOTE — NURSING NOTE
FOLLOW-UP VISIT    Patient Name: Estuardo Bowden      : 1960     Age: 58 year old        ______________________________________________________________________________     Chief Complaint   Patient presents with     Cancer     Radiation Oncology return visit with Dr. James     /84 (BP Location: Left arm, Patient Position: Sitting, Cuff Size: Adult Large)   Pulse 79   Temp 98.4  F (36.9  C) (Oral)   Resp 18   Wt 101.7 kg (224 lb 3.2 oz)   SpO2 96%   BMI 35.11 kg/m       Date Radiation Completed: 7,000 cGy to Prostate Bed completed on 2017    Pain  Denies    Labs  Other Labs: No    Imaging  None        PSA:   PSA   Date Value Ref Range Status   2018 <0.01 0 - 4 ug/L Final     Comment:     Assay Method:  Chemiluminescence using Siemens Vista analyzer   2018 <0.01 0 - 4 ug/L Final     Comment:     Assay Method:  Chemiluminescence using Siemens Vista analyzer   2017 <0.01 0 - 4 ug/L Final     Comment:     Assay Method:  Chemiluminescence using Siemens Vista analyzer   2017 0.28 0 - 4 ug/L Final     Comment:     Assay Method:  Chemiluminescence using Siemens Vista analyzer   10/27/2016 20.90 (H) 0 - 4 ug/L Final     Comment:     Assay Method:  Chemiluminescence using Siemens Vista analyzer   06/15/2016 15.69 (H) 0 - 4 ug/L Final   2016 16.62 (H) 0 - 4 ug/L Final   2015 11.40 (H) 0 - 4 ug/L Final   2015 11.23 (H) 0 - 4 ug/L Final   2010  0 - 4 ug/L Corrected    Incorrectly ordered by PCU/Clinic   Test reordered under corrected code.  CORRECTED ON  AT 1703: PREVIOUSLY REPORTED AS 4.29   2010 4.29 (H) 0 - 4 ug/L Final     Testosterone Level: No results found for: TESTOSTTOTAL    On-Study AUA Symptom Score (PQ)  AUA (American Urological Association) Symptom Score  1. Over the past month, how often have you had a sensation of not emptying your bladder completely after you finished urinating?: About half the time  2. Over the past month, how  often have you had to urinate again less than two hours after you finished urinating?: About half the time  3. Over the past month, how often have you found you stopped and started again several times when you urinated?: Less than 1 time in 5  4. Over the past month, how often have you found it difficult to postpone urination?: More than half the time  5. Over the past month, how often have you had a weak urine stream?: Less than 1 time in 5  6. Over the past month, how often have you had to push or strain to begin urinating?: Not at all  7. Over the past month, how many times did you typically get up to urinate from the time you went to bed at night until the time you got up in the morning?: 2 times  The Disease-specific Quality of Life Question: If you were to spend the rest of your life with your urinary condition just the way it is now, how would you feel about that? (highlight or underline): Unhappy  AUA Score: 14  Potency Score: 1    Diarrhea: 0- None    Constipation: 0- None      Other Appointments:     MD Name: Dr. James Appointment Date: 06/05/2019   MD Name: Appointment Date:   MD Name: Appointment Date:             Nurse face-to-face time: Level 2:  5 min face to face time

## 2019-04-09 DIAGNOSIS — C61 PROSTATE CANCER (H): ICD-10-CM

## 2019-04-09 LAB — PSA SERPL-MCNC: <0.01 UG/L (ref 0–4)

## 2019-04-09 PROCEDURE — 36415 COLL VENOUS BLD VENIPUNCTURE: CPT | Performed by: UROLOGY

## 2019-04-09 PROCEDURE — 84153 ASSAY OF PSA TOTAL: CPT | Performed by: UROLOGY

## 2019-04-19 ENCOUNTER — OFFICE VISIT (OUTPATIENT)
Dept: UROLOGY | Facility: CLINIC | Age: 59
End: 2019-04-19
Payer: COMMERCIAL

## 2019-04-19 VITALS — HEART RATE: 76 BPM | RESPIRATION RATE: 12 BRPM | SYSTOLIC BLOOD PRESSURE: 154 MMHG | DIASTOLIC BLOOD PRESSURE: 94 MMHG

## 2019-04-19 DIAGNOSIS — C61 PROSTATE CANCER (H): ICD-10-CM

## 2019-04-19 DIAGNOSIS — R03.0 ELEVATED BLOOD PRESSURE READING WITHOUT DIAGNOSIS OF HYPERTENSION: ICD-10-CM

## 2019-04-19 DIAGNOSIS — N39.46 MIXED INCONTINENCE URGE AND STRESS (MALE)(FEMALE): ICD-10-CM

## 2019-04-19 PROCEDURE — 99214 OFFICE O/P EST MOD 30 MIN: CPT | Mod: 25 | Performed by: UROLOGY

## 2019-04-19 PROCEDURE — 51798 US URINE CAPACITY MEASURE: CPT | Performed by: UROLOGY

## 2019-04-19 PROCEDURE — 54235 NJX CORPORA CAVERNOSA RX AGT: CPT | Performed by: UROLOGY

## 2019-04-19 NOTE — PROGRESS NOTES
Chief Complaint   Patient presents with     WINDY Marte Norberto Bowden is a 58 year old male who presents today for follow up of   Chief Complaint   Patient presents with     RECHECK    f/u for prostate cancer s/p RRP / XRT for group 4 cancer.  XRT was finished on 2017.  He has been on hormonal therapy for one year.  His energy level is good.  He has no side effects from eligard.  His urinary flow is good.  He is s/p bladder neck.  His flow is good.  He still has some mild stress incontinence.  He has no erections.   Viagra did not help.  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 [facility] with Dr. James     Past Surgical History:   Procedure Laterality Date     ARTHROTOMY SHOULDER, ROTATOR CUFF REPAIR, COMBINED Right 10/7/2016    Right MICHELERMartine Mumford     BIOPSY PROSTATE TRANSRECTAL  05/07/2015     BIOPSY PROSTATE TRANSRECTAL  07/06/2016     CYSTOSCOPY, DILATE URETHRA, COMBINED N/A 5/11/2017    Procedure: COMBINED CYSTOSCOPY, DILATE URETHRA;  Cystoscopy, ballon dilation;  Surgeon: Ross Rust MD;  Location:  OR     PROSTATECTOMY RETROPUBIC RADICAL  12/19/2016     SIGMOIDOSCOPY FLEXIBLE  7/15/2013    Procedure: SIGMOIDOSCOPY FLEXIBLE;  Sigmoidoscopy Flexible;  Surgeon: Dusty Chang MD;  Location:  GI     Family History   Problem Relation Age of Onset     Diabetes Maternal Grandmother      Hypertension Mother      Prostate Cancer Maternal Grandfather      C.A.D. No family hx of       Coronary Artery Disease No family hx of      Breast Cancer No family hx of      Ovarian Cancer No family hx of      Mental Illness No family hx of      Asthma No family hx of      Obesity No family hx of      Unknown/Adopted No family hx of      Anesthesia Reaction No family hx of      Cerebrovascular Disease No family hx of      Other Cancer No family hx of      Cancer - colorectal No family hx of      Hyperlipidemia No family hx of      Known Genetic Syndrome No family hx of      Osteoporosis No family hx of      Depression No family hx of      Anxiety Disorder No family hx of      Mental Illness No family hx of      Substance Abuse No family hx of      Thyroid Disease No family hx of      Genetic Disorder No family hx of      Colon Cancer No family hx of      Social History     Social History     Marital status:      Spouse name: N/A     Number of children: 2     Years of education: N/A     Occupational History      [employer]     Social History Main Topics     Smoking status: Never Smoker     Smokeless tobacco: Current User     Types: Chew      Comment: 1 tin every 3 days     Alcohol use 7.2 oz/week     12 Cans of beer, 0 Standard drinks or equivalent per week      Comment: 12 beers per week     Drug use: No     Sexual activity: Yes     Partners: Female     Birth control/ protection: None     Other Topics Concern     Parent/Sibling W/ Cabg, Mi Or Angioplasty Before 65f 55m? No      Service No     Blood Transfusions No     Caffeine Concern No     Occupational Exposure No     Hobby Hazards No     Sleep Concern No     Stress Concern No     Weight Concern No     Special Diet No     Back Care No     Exercise Yes     [employer] 12+ times per month     Seat Belt Yes     Self-Exams Yes     Social History Narrative       REVIEW OF SYSTEMS  =================  C: NEGATIVE for fever, chills, change in weight  I: NEGATIVE for worrisome rashes, moles or lesions  E/M: NEGATIVE for ear, mouth and  throat problems  R: NEGATIVE for significant cough or SHORTNESS OF BREATH,   CV: NEGATIVE for chest pain, palpitations or peripheral edema  GI: NEGATIVE for nausea, abdominal pain, heartburn, or change in bowel habits  NEURO: NEGATIVE any motor/sensory changes  PSYCH: NEGATIVE for recent mood disorder    Physical Exam:  BP (!) 154/94 (BP Location: Right arm, Patient Position: Chair, Cuff Size: Adult Large)   Pulse 76   Resp 12    Patient is pleasant, in no acute distress, good general condition.  Lung: no evidence of respiratory distress    Abdomen: Soft, nondistended, non tender. No masses. No rebound or guarding.   Exam: no cva tenderness  Skin: Warm and dry.  No redness.  Psych: normal mood and affect  Neuro: alert and oriented    Assessment/Plan:   (C61) Prostate cancer (H)  (primary encounter diagnosis)  Comment: discussed pros and cons of cont hormonal therapy.  Plan:  He elects cont with hormonal therapy for a total of 2 years as planned        eligard given today          Repeat psa in six months    (N39.46) Mixed incontinence urge and stress (male)(female)  Comment:  0 ml residualr   Plan: might need AUS but I would hold off for now until BNC would not come back.    (N32.0) Bladder neck contracture  Comment: s/p BNC  Plan: prn    (N52.9) Male impotence  Comment:    Plan: 40 mcg of edex injected today with good results.    (R03.0) Elevated blood pressure reading without diagnosis of hypertension  Comment:    Plan: Patient to follow up with Primary Care provider regarding elevated blood pressure.

## 2019-04-19 NOTE — PROGRESS NOTES
Prior to injection, verified patient identity using patient's name and date of birth.  Due to injection administration, patient instructed to remain in clinic for 15 minutes  afterwards, and to report any adverse reaction to me immediately.    edex    Drug Amount Wasted:  None.  Vial/Syringe: Single dose vial  Expiration Date:  12/2020    The following medication was given:     MEDICATION: Edex 40 mcg  ROUTE: Intra cavernous   SITE: L   DOSE: 40 mcg   LOT #: [id number]  :  Actient   EXPIRATION DATE:  12/2020  NDC#: 89482-854-75     Lita Appiah RN

## 2019-04-19 NOTE — PROGRESS NOTES
Prior to injection, verified patient identity using patient's name and date of birth.  Due to injection administration, patient instructed to remain in clinic for 15 minutes  afterwards, and to report any adverse reaction to me immediately.    Eligard     Drug Amount Wasted:  None.  Vial/Syringe: Single dose vial  Expiration Date:  09/2020    The following medication was given:     MEDICATION: Eligard 45 mg  ROUTE: SQ  SITE: RLQ - Abd.  DOSE: 45 mg  LOT #: [id number]  :  Shantelle   EXPIRATION DATE:  09/2020  NDC#: 94106-819-45  Lita Appiah RN

## 2019-04-19 NOTE — PATIENT INSTRUCTIONS
[facility] [phone]       Patient Education     Alprostadil intracavernosal injection  Brand Names: Caverject, Caverject Impulse, Edex  What is this medicine?  ALPROSTADIL (al PROS ta dil) is used to treat erectile dysfunction (ED). This medicine helps to create and maintain an erection.  How should I use this medicine?  This medicine is for injection into the penis. You will be taught how to use this medicine. Use exactly as directed. Do not take your medicine more often than directed.  It is important that you put your used needles and syringes in a special sharps container. Do not put them in a trash can. If you do not have a sharps container, call your pharmacist or healthcare provider to get one.  This medicine is for use in men only and is not for use in children.  What side effects may I notice from receiving this medicine?  Side effects that you should report to your doctor or health care professional as soon as possible:    allergic reactions like skin rash, itching or hives, swelling of the face, lips, or tongue    prolonged or painful erection  Side effects that usually do not require medical attention (report to your doctor or health care professional if they continue or are bothersome):    bleeding, bruising, or pain at site of injection    change in blood pressure  What may interact with this medicine?    medicines for blood pressure    What if I miss a dose?  You should not use this medicine more than 3 times a week. Each dose should be given at least 24 hours apart.  Where should I keep my medicine?  Keep out of reach of children.  Caverject: Store unopened product at or below 25 degrees C (77 degrees F). Do not freeze. See product instructions for storage when product is in use. Different products may have different instructions for storage. Throw away any unused medicine after the expiration date.  Edex: Store unopened product at room temperature between 15 and 30 degrees C (59 and 86  degrees F). Do not freeze. See product instructions for storage when product is in use. Throw away any unused medicine after the expiration date.  When traveling, do not store this medicine in checked luggage during air travel or leave in a closed automobile.  What should I tell my health care provider before I take this medicine?  They need to know if you have any of these conditions:    an abnormally formed penis    have been advised not to engage in sexual activity    have ever had a painful erection that lasted more than 4 hours    heart problems    leukemia    low blood pressure    penile implant    sickle cell disease or trait    tumor of the bone marrow (multiple myeloma)    an unusual or allergic reaction to alprostadil or other medicines, foods, dyes, or preservatives  What should I watch for while using this medicine?  Contact your doctor or health care professional right away if you have an erection that lasts longer than 4 hours or if it becomes painful. This may be a sign of a serious problem and must be treated right away to prevent permanent damage.  Do not change the dose of your medication. Call your doctor or health care professional to determine if your dose needs to be changed.  This medicine does not protect you or your partner against HIV infection (the virus that causes AIDS) or other sexually transmitted diseases.  NOTE:This sheet is a summary. It may not cover all possible information. If you have questions about this medicine, talk to your doctor, pharmacist, or health care provider. Copyright  2018 [employer]

## 2019-04-24 ENCOUNTER — ANCILLARY PROCEDURE (OUTPATIENT)
Dept: GENERAL RADIOLOGY | Facility: CLINIC | Age: 59
End: 2019-04-24
Payer: COMMERCIAL

## 2019-04-24 ENCOUNTER — OFFICE VISIT (OUTPATIENT)
Dept: FAMILY MEDICINE | Facility: CLINIC | Age: 59
End: 2019-04-24
Payer: COMMERCIAL

## 2019-04-24 VITALS
HEIGHT: 67 IN | OXYGEN SATURATION: 97 % | HEART RATE: 80 BPM | TEMPERATURE: 98.4 F | WEIGHT: 231.5 LBS | BODY MASS INDEX: 36.34 KG/M2 | DIASTOLIC BLOOD PRESSURE: 88 MMHG | SYSTOLIC BLOOD PRESSURE: 128 MMHG | RESPIRATION RATE: 18 BRPM

## 2019-04-24 DIAGNOSIS — R05.9 COUGH: ICD-10-CM

## 2019-04-24 DIAGNOSIS — J20.9 ACUTE BRONCHITIS WITH SYMPTOMS GREATER THAN 10 DAYS: Primary | ICD-10-CM

## 2019-04-24 PROCEDURE — 71046 X-RAY EXAM CHEST 2 VIEWS: CPT | Mod: FY

## 2019-04-24 PROCEDURE — 99213 OFFICE O/P EST LOW 20 MIN: CPT | Performed by: NURSE PRACTITIONER

## 2019-04-24 RX ORDER — AZITHROMYCIN 250 MG/1
TABLET, FILM COATED ORAL
Qty: 6 TABLET | Refills: 0 | Status: SHIPPED | OUTPATIENT
Start: 2019-04-24 | End: 2019-04-29

## 2019-04-24 ASSESSMENT — PAIN SCALES - GENERAL: PAINLEVEL: NO PAIN (0)

## 2019-04-24 ASSESSMENT — MIFFLIN-ST. JEOR: SCORE: 1823.71

## 2019-04-24 NOTE — PROGRESS NOTES
SUBJECTIVE:   Estuardo Bowden is a 58 year old male who presents to clinic today for the following health issues:      HPI  Acute Illness   Acute illness concerns: Cough, congestion  Onset: 3 weeks    Fever: no    Chills/Sweats: no    Headache (location?): no    Sinus Pressure:no    Conjunctivitis:  no    Ear Pain: no    Rhinorrhea: no    Congestion: YES    Sore Throat: YES- from coughing     Cough: YES-productive of yellow sputum    Wheeze: YES    Decreased Appetite: YES    Nausea: no    Vomiting: no    Diarrhea:  no    Dysuria/Freq.: no    Fatigue/Achiness: YES    Sick/Strep Exposure: no      Therapies Tried and outcome: Dayquil, does not help          Reviewed and updated as needed this visit by clinical staff  Tobacco  Allergies  Meds  Med Hx  Surg Hx  Fam Hx  Soc Hx        Reviewed and updated as needed this visit by Provider             Patient Active Problem List   Diagnosis     CARDIOVASCULAR SCREENING; LDL GOAL LESS THAN 130     Tobacco use disorder     ED (erectile dysfunction)     Prostate cancer (H)     Biceps tendonitis, right     AC (acromioclavicular) joint arthritis     Complete tear of right rotator cuff     S/P complete repair of rotator cuff     Acute deep vein thrombosis (DVT) of distal vein of left lower extremity (H)     Other acute pulmonary embolism without acute cor pulmonale (H)     Morbid obesity (H)     Past Surgical History:   Procedure Laterality Date     ARTHROTOMY SHOULDER, ROTATOR CUFF REPAIR, COMBINED Right 10/7/2016    Right Martine CASE [place]     BIOPSY PROSTATE TRANSRECTAL  05/07/2015     BIOPSY PROSTATE TRANSRECTAL  07/06/2016     CYSTOSCOPY, DILATE URETHRA, COMBINED N/A 5/11/2017    Procedure: COMBINED CYSTOSCOPY, DILATE URETHRA;  Cystoscopy, ballon dilation;  Surgeon: Ross Rust MD;  Location:  OR     PROSTATECTOMY RETROPUBIC RADICAL  12/19/2016     SIGMOIDOSCOPY FLEXIBLE  7/15/2013    Procedure: SIGMOIDOSCOPY FLEXIBLE;  Sigmoidoscopy Flexible;   Surgeon: Dusty Chang MD;  Location:  GI       Social History     Tobacco Use     Smoking status: Never Smoker     Smokeless tobacco: Current User     Types: Chew     Tobacco comment: 1 tin every 3 days   Substance Use Topics     Alcohol use: Yes     Alcohol/week: 7.2 oz     Types: 12 Cans of beer per week     Comment: 12 beers per week     Family History   Problem Relation Age of Onset     Diabetes Maternal Grandmother      Hypertension Mother      Prostate Cancer Maternal Grandfather      C.A.D. No family hx of      Coronary Artery Disease No family hx of      Breast Cancer No family hx of      Ovarian Cancer No family hx of      Mental Illness No family hx of      Asthma No family hx of      Obesity No family hx of      Unknown/Adopted No family hx of      Anesthesia Reaction No family hx of      Cerebrovascular Disease No family hx of      Other Cancer No family hx of      Cancer - colorectal No family hx of      Hyperlipidemia No family hx of      Known Genetic Syndrome No family hx of      Osteoporosis No family hx of      Depression No family hx of      Anxiety Disorder No family hx of      Mental Illness No family hx of      Substance Abuse No family hx of      Thyroid Disease No family hx of      Genetic Disorder No family hx of      Colon Cancer No family hx of          Current Outpatient Medications   Medication Sig Dispense Refill     azithromycin (ZITHROMAX) 250 MG tablet Take 2 tablets (500 mg) by mouth daily for 1 day, THEN 1 tablet (250 mg) daily for 4 days. 6 tablet 0     Calcium Carbonate-Vit D-Min (CALCIUM 1200) 7028-7324 MG-UNIT CHEW Take 1 tablet by mouth daily 30 tablet      leuprolide (ELIGARD) 45 MG kit Inject 45 mg Subcutaneous every 6 months       No Known Allergies  BP Readings from Last 3 Encounters:   04/24/19 128/88   04/19/19 (!) 154/94   12/14/18 149/84    Wt Readings from Last 3 Encounters:   04/24/19 105 kg (231 lb 8 oz)   12/14/18 101.7 kg (224 lb 3.2 oz)   09/27/18 99.8  "kg (220 lb)                    ROS:  Constitutional, HEENT, cardiovascular, pulmonary, gi and gu systems are negative, except as otherwise noted.    OBJECTIVE:     /88   Pulse 80   Temp 98.4  F (36.9  C) (Temporal)   Resp 18   Ht 1.702 m (5' 7\")   Wt 105 kg (231 lb 8 oz)   SpO2 97%   BMI 36.26 kg/m    Body mass index is 36.26 kg/m .  GENERAL: healthy, alert and no distress  NECK: no adenopathy, no asymmetry, masses, or scars and thyroid normal to palpation  RESP: rhonchi R upper posterior, R mid posterior and R lower posterior and expiratory wheezes   CV: regular rate and rhythm, normal S1 S2, no S3 or S4, no murmur, click or rub, no peripheral edema and peripheral pulses strong  ABDOMEN: soft, nontender, no hepatosplenomegaly, no masses and bowel sounds normal  MS: no gross musculoskeletal defects noted, no edema    Diagnostic Test Results:  CXR - unremarkable    ASSESSMENT/PLAN:     1. Acute bronchitis with symptoms greater than 10 days  Based on history and clinical exam, will prescribe the following antibiotic.  - azithromycin (ZITHROMAX) 250 MG tablet; Take 2 tablets (500 mg) by mouth daily for 1 day, THEN 1 tablet (250 mg) daily for 4 days.  Dispense: 6 tablet; Refill: 0    Patient agrees with plan of care and will call clinic for any worsening symptoms.      Patient Instructions     Take antibiotic as prescribed.    Ensure you are drinking plenty of fluids.    Please call or return to clinic if symptoms are not resolving or becoming worse.    Margarita Jones, CNP    Patient Education     What Is Acute Bronchitis?  Acute bronchitis is when the airways in your lungs (bronchial tubes) become red and swollen (inflamed). It is usually caused by a viral infection. But it can also occur because of a bacteria or allergen. Symptoms include a cough that produces yellow or greenish mucus and can last for days or sometimes weeks.  Inside healthy lungs    Air travels in and out of the lungs through the " airways. The linings of these airways produce sticky mucus. This mucus traps particles that enter the lungs. Tiny structures called cilia then sweep the particles out of the airways.     Healthy airway: Airways are normally open. Air moves in and out easily.      Healthy cilia: Tiny, hairlike cilia sweep mucus and particles up and out of the airways.     Lungs with bronchitis  Bronchitis often occurs with a cold or the flu virus. The airways become inflamed (red and swollen). There is a deep hacking cough from the extra mucus. Other symptoms may include:    Wheezing    Chest discomfort    Shortness of breath    Mild fever  A second infection, this time due to bacteria, may then occur. And airways irritated by allergens or smoke are more likely to get infected.        Inflamed airway: Inflammation and extra mucus narrow the airway, causing shortness of breath.      Impaired cilia: Extra mucus impairs cilia, causing congestion and wheezing. Smoking makes the problem worse.     Making a diagnosis  A physical exam, health history, and certain tests help your healthcare provider make the diagnosis.  Health history  Your healthcare provider will ask you about your symptoms.  The exam  Your provider listens to your chest for signs of congestion. He or she may also check your ears, nose, and throat.  Possible tests    A sputum test for bacteria. This requires a sample of mucus from your lungs.    A nasal or throat swab. This tests to see if you have a bacterial infection.    A chest X-ray. This is done if your healthcare provider thinks you have pneumonia.    Tests to check for an underlying condition. Other tests may be done to check for things such as allergies, asthma, or COPD (chronic obstructive pulmonary disease). You may need to see a specialist for more lung function testing.  Treating a cough  The main treatment for bronchitis is easing symptoms. Avoiding smoke, allergens, and other things that trigger coughing can  often help. If the infection is bacterial, you may be given antibiotics. During the illness, it's important to get plenty of sleep. To ease symptoms:    Don t smoke. Also avoid secondhand smoke.    Use a humidifier. Or try breathing in steam from a hot shower. This may help loosen mucus.    Drink a lot of water and juice. They can soothe the throat and may help thin mucus.    Sit up or use extra pillows when in bed. This helps to lessen coughing and congestion.    Ask your provider about using medicine. Ask about using cough medicine, pain and fever medicine, or a decongestant.  Antibiotics  Most cases of bronchitis are caused by cold or flu viruses. They don t need antibiotics to treat them, even if your mucus is thick and green or yellow. Antibiotics don t treat viral illness and antibiotics have not been shown to have any benefit in cases of acute bronchitis. Taking antibiotics when they are not needed increases your risk of getting an infection later that is antibiotic-resistant. Antibiotics can also cause severe cases of diarrhea that require other antibiotics to treat.  It is important that you accept your healthcare provider's opinion to not use antibiotics. Your provider will prescribe antibiotics if the infection is caused by bacteria. If they are prescribed:    Take all of the medicine. Take the medicine until it is used up, even if symptoms have improved. If you don t, the bronchitis may come back.    Take the medicines as directed. For instance, some medicines should be taken with food.    Ask about side effects. Ask your provider or pharmacist what side effects are common, and what to do about them.  Follow-up care  You should see your provider again in 2 to 3 weeks. By this time, symptoms should have improved. An infection that lasts longer may mean you have a more serious problem.  Prevention    Avoid tobacco smoke. If you smoke, quit. Stay away from smoky places. Ask friends and family not to smoke  around you, or in your home or car.    Get checked for allergies.    Ask your provider about getting a yearly flu shot. Also ask about pneumococcal or pneumonia shots.    Wash your hands often. This helps reduce the chance of picking up viruses that cause colds and flu.  Call your healthcare provider if:    Symptoms worsen, or you have new symptoms    Breathing problems worsen or  become severe    Symptoms don t get better within a week, or within 3 days of taking antibiotics   Date Last Reviewed: 2/1/2017 2000-2018 [employer]. [address] [place] [id number]. All rights reserved. This information is not intended as a substitute for professional medical care. Always follow your healthcare professional's instructions.           Patient Education     Bronchitis, Antibiotic Treatment (Adult)    Bronchitis is an infection of the air passages (bronchial tubes) in your lungs. It often occurs when you have a cold. This illness is contagious during the first few days and is spread through the air by coughing and sneezing, or by direct contact (touching the sick person and then touching your own eyes, nose, or mouth).  Symptoms of bronchitis include cough with mucus (phlegm) and low-grade fever. Bronchitis usually lasts 7 to 14 days. Mild cases can be treated with simple home remedies. More severe infection is treated with an antibiotic.  Home care  Follow these guidelines when caring for yourself at home:    If your symptoms are severe, rest at home for the first 2 to 3 days. When you go back to your usual activities, don't let yourself get too tired.    Don't smoke. Also stay away from secondhand smoke.    You may use over-the-counter medicines to control fever or pain, unless another medicine was prescribed. If you have chronic liver or kidney disease or have ever had a stomach ulcer or gastrointestinal bleeding, talk with your healthcare provider before using these medicines. Also talk to your  provider if you are taking medicine to prevent blood clots. Aspirin should never be given to anyone younger than 18 who is ill with a viral infection or fever. It may cause severe liver or brain damage.    Your appetite may be low, so a light diet is fine. Stay well hydrated by drinking 6 to 8 glasses of fluids per day. This includes water, soft drinks, sports drinks, juices, tea, or soup. Extra fluids will help loosen mucus in your nose and lungs.    Over-the-counter cough, cold, and sore-throat medicines will not shorten the length of the illness, but they may be helpful to reduce your symptoms. Don't use decongestants if you have high blood pressure.    Finish all antibiotic medicine. Do this even if you are feeling better after only a few days.  Follow-up care  Follow up with your healthcare provider, or as advised. If you had an X-ray or ECG (electrocardiogram), a specialist will review it. You will be told of any new test results that may affect your care.  If you are age 65 or older, if you smoke, or if you have a chronic lung disease or condition that affects your immune system, ask your healthcare provider about getting a pneumococcal vaccine and a yearly flu shot (influenza vaccine).  When to seek medical advice  Call your healthcare provider right away if any of these occur:    Fever of 100.4 F (38 C) or higher, or as directed by your healthcare provider    Coughing up more sputum    Weakness, drowsiness, headache, facial pain, ear pain, or a stiff neck  Call 911  Call 911 if any of these occur.    Coughing up blood    Weakness, drowsiness, headache, or stiff neck that get worse    Trouble breathing, wheezing, or pain with breathing  Date Last Reviewed: 6/1/2018 2000-2018 [employer]. [address] [place] [address], [place] [address]. All rights reserved. This information is not intended as a substitute for professional medical care. Always follow your healthcare professional's  instructions.               Margarita Jones, [facility]

## 2019-04-24 NOTE — PATIENT INSTRUCTIONS
Take antibiotic as prescribed.    Ensure you are drinking plenty of fluids.    Please call or return to clinic if symptoms are not resolving or becoming worse.    Margarita Jones, CNP    Patient Education     What Is Acute Bronchitis?  Acute bronchitis is when the airways in your lungs (bronchial tubes) become red and swollen (inflamed). It is usually caused by a viral infection. But it can also occur because of a bacteria or allergen. Symptoms include a cough that produces yellow or greenish mucus and can last for days or sometimes weeks.  Inside healthy lungs    Air travels in and out of the lungs through the airways. The linings of these airways produce sticky mucus. This mucus traps particles that enter the lungs. Tiny structures called cilia then sweep the particles out of the airways.     Healthy airway: Airways are normally open. Air moves in and out easily.      Healthy cilia: Tiny, hairlike cilia sweep mucus and particles up and out of the airways.     Lungs with bronchitis  Bronchitis often occurs with a cold or the flu virus. The airways become inflamed (red and swollen). There is a deep hacking cough from the extra mucus. Other symptoms may include:    Wheezing    Chest discomfort    Shortness of breath    Mild fever  A second infection, this time due to bacteria, may then occur. And airways irritated by allergens or smoke are more likely to get infected.        Inflamed airway: Inflammation and extra mucus narrow the airway, causing shortness of breath.      Impaired cilia: Extra mucus impairs cilia, causing congestion and wheezing. Smoking makes the problem worse.     Making a diagnosis  A physical exam, health history, and certain tests help your healthcare provider make the diagnosis.  Health history  Your healthcare provider will ask you about your symptoms.  The exam  Your provider listens to your chest for signs of congestion. He or she may also check your ears, nose, and throat.  Possible  tests    A sputum test for bacteria. This requires a sample of mucus from your lungs.    A nasal or throat swab. This tests to see if you have a bacterial infection.    A chest X-ray. This is done if your healthcare provider thinks you have pneumonia.    Tests to check for an underlying condition. Other tests may be done to check for things such as allergies, asthma, or COPD (chronic obstructive pulmonary disease). You may need to see a specialist for more lung function testing.  Treating a cough  The main treatment for bronchitis is easing symptoms. Avoiding smoke, allergens, and other things that trigger coughing can often help. If the infection is bacterial, you may be given antibiotics. During the illness, it's important to get plenty of sleep. To ease symptoms:    Don t smoke. Also avoid secondhand smoke.    Use a humidifier. Or try breathing in steam from a hot shower. This may help loosen mucus.    Drink a lot of water and juice. They can soothe the throat and may help thin mucus.    Sit up or use extra pillows when in bed. This helps to lessen coughing and congestion.    Ask your provider about using medicine. Ask about using cough medicine, pain and fever medicine, or a decongestant.  Antibiotics  Most cases of bronchitis are caused by cold or flu viruses. They don t need antibiotics to treat them, even if your mucus is thick and green or yellow. Antibiotics don t treat viral illness and antibiotics have not been shown to have any benefit in cases of acute bronchitis. Taking antibiotics when they are not needed increases your risk of getting an infection later that is antibiotic-resistant. Antibiotics can also cause severe cases of diarrhea that require other antibiotics to treat.  It is important that you accept your healthcare provider's opinion to not use antibiotics. Your provider will prescribe antibiotics if the infection is caused by bacteria. If they are prescribed:    Take all of the medicine. Take  the medicine until it is used up, even if symptoms have improved. If you don t, the bronchitis may come back.    Take the medicines as directed. For instance, some medicines should be taken with food.    Ask about side effects. Ask your provider or pharmacist what side effects are common, and what to do about them.  Follow-up care  You should see your provider again in 2 to 3 weeks. By this time, symptoms should have improved. An infection that lasts longer may mean you have a more serious problem.  Prevention    Avoid tobacco smoke. If you smoke, quit. Stay away from smoky places. Ask friends and family not to smoke around you, or in your home or car.    Get checked for allergies.    Ask your provider about getting a yearly flu shot. Also ask about pneumococcal or pneumonia shots.    Wash your hands often. This helps reduce the chance of picking up viruses that cause colds and flu.  Call your healthcare provider if:    Symptoms worsen, or you have new symptoms    Breathing problems worsen or  become severe    Symptoms don t get better within a week, or within 3 days of taking antibiotics   Date Last Reviewed: 2/1/2017 2000-2018 [employer]. [address] [place] [address]. All rights reserved. This information is not intended as a substitute for professional medical care. Always follow your healthcare professional's instructions.           Patient Education     Bronchitis, Antibiotic Treatment (Adult)    Bronchitis is an infection of the air passages (bronchial tubes) in your lungs. It often occurs when you have a cold. This illness is contagious during the first few days and is spread through the air by coughing and sneezing, or by direct contact (touching the sick person and then touching your own eyes, nose, or mouth).  Symptoms of bronchitis include cough with mucus (phlegm) and low-grade fever. Bronchitis usually lasts 7 to 14 days. Mild cases can be treated with simple home  remedies. More severe infection is treated with an antibiotic.  Home care  Follow these guidelines when caring for yourself at home:    If your symptoms are severe, rest at home for the first 2 to 3 days. When you go back to your usual activities, don't let yourself get too tired.    Don't smoke. Also stay away from secondhand smoke.    You may use over-the-counter medicines to control fever or pain, unless another medicine was prescribed. If you have chronic liver or kidney disease or have ever had a stomach ulcer or gastrointestinal bleeding, talk with your healthcare provider before using these medicines. Also talk to your provider if you are taking medicine to prevent blood clots. Aspirin should never be given to anyone younger than 18 who is ill with a viral infection or fever. It may cause severe liver or brain damage.    Your appetite may be low, so a light diet is fine. Stay well hydrated by drinking 6 to 8 glasses of fluids per day. This includes water, soft drinks, sports drinks, juices, tea, or soup. Extra fluids will help loosen mucus in your nose and lungs.    Over-the-counter cough, cold, and sore-throat medicines will not shorten the length of the illness, but they may be helpful to reduce your symptoms. Don't use decongestants if you have high blood pressure.    Finish all antibiotic medicine. Do this even if you are feeling better after only a few days.  Follow-up care  Follow up with your healthcare provider, or as advised. If you had an X-ray or ECG (electrocardiogram), a specialist will review it. You will be told of any new test results that may affect your care.  If you are age 65 or older, if you smoke, or if you have a chronic lung disease or condition that affects your immune system, ask your healthcare provider about getting a pneumococcal vaccine and a yearly flu shot (influenza vaccine).  When to seek medical advice  Call your healthcare provider right away if any of these occur:    Fever  of 100.4 F (38 C) or higher, or as directed by your healthcare provider    Coughing up more sputum    Weakness, drowsiness, headache, facial pain, ear pain, or a stiff neck  Call 911  Call 911 if any of these occur.    Coughing up blood    Weakness, drowsiness, headache, or stiff neck that get worse    Trouble breathing, wheezing, or pain with breathing  Date Last Reviewed: 6/1/2018 2000-2018 [employer]. [address] [place] [address]. All rights reserved. This information is not intended as a substitute for professional medical care. Always follow your healthcare professional's instructions.

## 2019-04-29 ENCOUNTER — MYC MEDICAL ADVICE (OUTPATIENT)
Dept: FAMILY MEDICINE | Facility: CLINIC | Age: 59
End: 2019-04-29

## 2019-05-16 ENCOUNTER — TELEPHONE (OUTPATIENT)
Dept: RADIATION ONCOLOGY | Facility: CLINIC | Age: 59
End: 2019-05-16

## 2019-05-16 NOTE — TELEPHONE ENCOUNTER
Patient contacted regarding provider change to Dr. Irene as Dr. James has left [facility].  Upcoming appointment changed to 06/04/2019 at 1515 to fit Dr. Irene's schedule.  Patient repeated information and had no further questions at this time.      Luciana Hitchcock MA

## 2019-05-16 NOTE — TELEPHONE ENCOUNTER
I left a voicemail for patient to call me back regarding provider change to Dr. Irene as Dr. James has left [facility].  Appointment on 06/05/2019 at 1530 will need to be rescheduled as well to fit Dr. Irene's schedule.      Luciana Hitchcock MA

## 2019-06-12 ENCOUNTER — TELEPHONE (OUTPATIENT)
Dept: RADIATION ONCOLOGY | Facility: CLINIC | Age: 59
End: 2019-06-12

## 2019-06-12 NOTE — TELEPHONE ENCOUNTER
Attempted to contact patient for a reminder call regarding their appointment with Dr. Irene on 06/14/2019 at 1045 at [facility].  Voicemail was left with appointment time, date and contact information for rescheduling if needed.          Luciana Hitchcock MA

## 2019-06-14 ENCOUNTER — OFFICE VISIT (OUTPATIENT)
Dept: RADIATION ONCOLOGY | Facility: CLINIC | Age: 59
End: 2019-06-14
Payer: COMMERCIAL

## 2019-06-14 VITALS
HEART RATE: 88 BPM | BODY MASS INDEX: 35.58 KG/M2 | RESPIRATION RATE: 16 BRPM | OXYGEN SATURATION: 98 % | SYSTOLIC BLOOD PRESSURE: 138 MMHG | DIASTOLIC BLOOD PRESSURE: 82 MMHG | WEIGHT: 227.2 LBS | TEMPERATURE: 99 F

## 2019-06-14 DIAGNOSIS — C61 MALIGNANT NEOPLASM OF PROSTATE (H): Primary | ICD-10-CM

## 2019-06-14 PROCEDURE — 99214 OFFICE O/P EST MOD 30 MIN: CPT | Performed by: RADIOLOGY

## 2019-06-14 ASSESSMENT — PAIN SCALES - GENERAL: PAINLEVEL: NO PAIN (0)

## 2019-06-14 NOTE — PROGRESS NOTES
RADIATION ONCOLOGY FOLLOW-UP VISIT  DATE: Jun 14, 2019    NAME: Estuardo Bowden  MRN: [id number]    DISEASE TREATED: prostate cancer s/p prostatectomy, GS 4+4, pT3bN0, neg margins, PSA 0.28 postop (iPSA 20.9)    RADIATION THERAPY DELIVERED: Salvage RT to 70 Gy with long term ADT    INTERVAL SINCE COMPLETION OF RT: 22 months since completion on 8/29/17    SUBJECTIVE:  Mr. Bowden is a 58 year old gentleman who completed radiation therapy for high risk prostate cancer per above. He returns to our clinic today for a routine follow-up. He had a bladder neck dilation in 4/2018 but has been doing well since. PSA on 9/5/18 was <0.01 and on 4/9/19 was <0.01. He had another Eligard injection 4/19/19. He is tolerating it well with mild hot flashes occasionally. His AUA score is 12 today, compared to 14 at last visit and 22 before that. He continues having mild incontinence that is improving slowly. He wears 1ppd (previously 2ppds); still doing Kegel exercises. He continues to deny change in his bowel movements or blood in his stool. His sexual health inventory score is 1/25. He is interested in trying interventions for his ED. His energy level is adequate, and he is still working full-time. He does endorse a lot of stress at work which is causing fatigue.    PHYSICAL EXAM:  VITALS: /82 (BP Location: Left arm, Patient Position: Sitting, Cuff Size: Adult Regular)   Pulse 88   Temp 99  F (37.2  C) (Oral)   Resp 16   Wt 103.1 kg (227 lb 3.2 oz)   SpO2 98%   BMI 35.58 kg/m    GEN: Appears well, alert, oriented, and in NAD  HEENT: EOMI, normal conjunctiva  CV: RRR, 1+ lower ext edema which is long standing and stable.   RESP: breathing comfortably on room air, CTAB  ABDOMEN: deferred  RECTAL: deferred given controlled PSA  SKIN: Normal color and turgor  NEURO: No focal deficits, normal gait  PSYCH: Appropriate mood and affect    LABS AND IMAGING: Reviewed      PSA   Date Value Ref Range Status   04/09/2019 <0.01 0  - 4 ug/L Final     Comment:     Assay Method:  Chemiluminescence using Siemens Vista analyzer       IMPRESSION/PLAN:   Mr. Bowden is a 58 year old male with high risk prostate cancer status post prostatectomy and salvage radiation. He is doing well with no biochemical evidence of recurrent disease; still on ADT. He had his last Eligard injection in 4/2019. He continues to follow up with Dr. Rust who is also managing his ED. He will return to clinic here in 6 months for follow up, with repeat PSAs completed through Dr. Rust's office. Continue calcium and vitamin D supplementation while on hormonal therapy.     Keli Irene M.D.  Attending Physician  Radiation Oncology

## 2019-06-14 NOTE — PATIENT INSTRUCTIONS
Preventive Care:    Colorectal Cancer Screening: During our visit today, we discussed that it is recommended you receive colorectal cancer screening. Please call or make an appointment with your primary care provider to discuss this. You may also call the [facility] scheduling line ([phone]) to set up a colonoscopy appointment.    Please contact [facility] RN with questions or concerns following today's appointment: [phone].    Thank you!

## 2019-06-14 NOTE — NURSING NOTE
FOLLOW-UP VISIT    Patient Name: Estuardo Bowden      : 1960     Age: 59 year old        ______________________________________________________________________________     Chief Complaint   Patient presents with     Cancer     Radiation Oncology return visit with Dr. Irene     /82 (BP Location: Left arm, Patient Position: Sitting, Cuff Size: Adult Regular)   Pulse 88   Temp 99  F (37.2  C) (Oral)   Resp 16   Wt 103.1 kg (227 lb 3.2 oz)   SpO2 98%   BMI 35.58 kg/m       Date Radiation Completed: 7,000 cGy to prostate bed completed on 2017    Pain  Denies    Labs  Other Labs: Yes: PSA on 2019    Imaging  None        PSA:   PSA   Date Value Ref Range Status   2019 <0.01 0 - 4 ug/L Final     Comment:     Assay Method:  Chemiluminescence using Siemens Vista analyzer   2018 <0.01 0 - 4 ug/L Final     Comment:     Assay Method:  Chemiluminescence using Siemens Vista analyzer   2018 <0.01 0 - 4 ug/L Final     Comment:     Assay Method:  Chemiluminescence using Siemens Vista analyzer   2017 <0.01 0 - 4 ug/L Final     Comment:     Assay Method:  Chemiluminescence using Siemens Vista analyzer   2017 0.28 0 - 4 ug/L Final     Comment:     Assay Method:  Chemiluminescence using Siemens Vista analyzer   10/27/2016 20.90 (H) 0 - 4 ug/L Final     Comment:     Assay Method:  Chemiluminescence using Siemens Vista analyzer   06/15/2016 15.69 (H) 0 - 4 ug/L Final   2016 16.62 (H) 0 - 4 ug/L Final   2015 11.40 (H) 0 - 4 ug/L Final   2015 11.23 (H) 0 - 4 ug/L Final     Testosterone Level: No results found for: TESTOSTTOTAL    On-Study AUA Symptom Score (PQ)  AUA (American Urological Association) Symptom Score  1. Over the past month, how often have you had a sensation of not emptying your bladder completely after you finished urinating?: Less than half the time  2. Over the past month, how often have you had to urinate again less than two hours after you  finished urinating?: More than half the time  3. Over the past month, how often have you found you stopped and started again several times when you urinated?: Not at all  4. Over the past month, how often have you found it difficult to postpone urination?: Less than half the time  5. Over the past month, how often have you had a weak urine stream?: Less than 1 time in 5  6. Over the past month, how often have you had to push or strain to begin urinating?: Less than 1 time in 5  7. Over the past month, how many times did you typically get up to urinate from the time you went to bed at night until the time you got up in the morning?: 2 times  The Disease-specific Quality of Life Question: If you were to spend the rest of your life with your urinary condition just the way it is now, how would you feel about that? (highlight or underline): Unhappy  AUA Score: 12    Potency Score: 1    Diarrhea: 0- None    Constipation: 0- None      Other Appointments:     MD Name:  Appointment Date:    MD Name: Appointment Date:   MD Name: Appointment Date:               Nurse face-to-face time: Level 2:  5 min face to face time

## 2019-10-04 ENCOUNTER — HEALTH MAINTENANCE LETTER (OUTPATIENT)
Age: 59
End: 2019-10-04

## 2019-10-28 ENCOUNTER — PATIENT OUTREACH (OUTPATIENT)
Dept: RADIATION ONCOLOGY | Facility: CLINIC | Age: 59
End: 2019-10-28

## 2019-10-28 NOTE — PROGRESS NOTES
Patient currently scheduled for appointments on Friday, 12/13/2019 with Dr. Irene at 1445.  Dr. Irene will not be in clinic on Friday, 12/13/2019.  This RN attempted contact with patient to offer patient to keep same appointment date and time and change provider to Dr. Renteria at [facility].  Direct contact information for this RN provided to return call to patient.    Also calling patient to review need for PSA and return visit with Dr. Rust per chart review.    Carmel Sprague RN BSN OCN

## 2019-11-19 ENCOUNTER — TELEPHONE (OUTPATIENT)
Dept: FAMILY MEDICINE | Facility: CLINIC | Age: 59
End: 2019-11-19

## 2019-11-19 ENCOUNTER — MYC MEDICAL ADVICE (OUTPATIENT)
Dept: UROLOGY | Facility: CLINIC | Age: 59
End: 2019-11-19

## 2019-11-19 DIAGNOSIS — C61 PROSTATE CANCER (H): Primary | ICD-10-CM

## 2019-11-20 DIAGNOSIS — C61 PROSTATE CANCER (H): ICD-10-CM

## 2019-11-20 LAB — PSA SERPL-ACNC: <0.01 UG/L (ref 0–4)

## 2019-11-20 PROCEDURE — G0103 PSA SCREENING: HCPCS | Performed by: NURSE PRACTITIONER

## 2019-11-20 PROCEDURE — 36415 COLL VENOUS BLD VENIPUNCTURE: CPT | Performed by: NURSE PRACTITIONER

## 2019-12-09 ENCOUNTER — OFFICE VISIT (OUTPATIENT)
Dept: UROLOGY | Facility: CLINIC | Age: 59
End: 2019-12-09
Payer: COMMERCIAL

## 2019-12-09 VITALS — DIASTOLIC BLOOD PRESSURE: 83 MMHG | HEART RATE: 79 BPM | SYSTOLIC BLOOD PRESSURE: 156 MMHG | RESPIRATION RATE: 14 BRPM

## 2019-12-09 DIAGNOSIS — C61 PROSTATE CANCER (H): Primary | ICD-10-CM

## 2019-12-09 DIAGNOSIS — N39.46 MIXED INCONTINENCE URGE AND STRESS (MALE)(FEMALE): ICD-10-CM

## 2019-12-09 DIAGNOSIS — R03.0 ELEVATED BLOOD PRESSURE READING WITHOUT DIAGNOSIS OF HYPERTENSION: ICD-10-CM

## 2019-12-09 DIAGNOSIS — N32.0 BLADDER NECK CONTRACTURE: ICD-10-CM

## 2019-12-09 PROCEDURE — 99214 OFFICE O/P EST MOD 30 MIN: CPT | Performed by: UROLOGY

## 2019-12-09 NOTE — PROGRESS NOTES
Chief Complaint   Patient presents with     WINDY Marte Norberto Bowden is a 59 year old male who presents today for follow up of   Chief Complaint   Patient presents with     RECHECK    f/u for prostate cancer s/p RRP / XRT for group 4 cancer.  XRT was finished on 2017.  He has been on hormonal therapy for 2 years.  His energy level is good.  He has no side effects from eligard.  His urinary flow is good.  He is s/p bladder neck.  His flow is good.  He still has some mild stress incontinence.  He has no erections.   Viagra did not help.  Recent psa was < 0.01.  Penile injections work ok.    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 [facility] with Dr. James     Past Surgical History:   Procedure Laterality Date     ARTHROTOMY SHOULDER, ROTATOR CUFF REPAIR, COMBINED Right 10/7/2016    Right MICHELERMartine Mumford     BIOPSY PROSTATE TRANSRECTAL  05/07/2015     BIOPSY PROSTATE TRANSRECTAL  07/06/2016     CYSTOSCOPY, DILATE URETHRA, COMBINED N/A 5/11/2017    Procedure: COMBINED CYSTOSCOPY, DILATE URETHRA;  Cystoscopy, ballon dilation;  Surgeon: Ross Rust MD;  Location:  OR     PROSTATECTOMY RETROPUBIC RADICAL  12/19/2016     SIGMOIDOSCOPY FLEXIBLE  7/15/2013    Procedure: SIGMOIDOSCOPY FLEXIBLE;  Sigmoidoscopy Flexible;  Surgeon: Dusty Chang MD;  Location:  GI     Family History   Problem Relation Age of Onset     Diabetes Maternal Grandmother      Hypertension Mother      Prostate Cancer Maternal Grandfather       C.A.D. No family hx of      Coronary Artery Disease No family hx of      Breast Cancer No family hx of      Ovarian Cancer No family hx of      Mental Illness No family hx of      Asthma No family hx of      Obesity No family hx of      Unknown/Adopted No family hx of      Anesthesia Reaction No family hx of      Cerebrovascular Disease No family hx of      Other Cancer No family hx of      Cancer - colorectal No family hx of      Hyperlipidemia No family hx of      Known Genetic Syndrome No family hx of      Osteoporosis No family hx of      Depression No family hx of      Anxiety Disorder No family hx of      Mental Illness No family hx of      Substance Abuse No family hx of      Thyroid Disease No family hx of      Genetic Disorder No family hx of      Colon Cancer No family hx of      Social History     Social History     Marital status:      Spouse name: N/A     Number of children: 2     Years of education: N/A     Occupational History      [employer]     Social History Main Topics     Smoking status: Never Smoker     Smokeless tobacco: Current User     Types: Chew      Comment: 1 tin every 3 days     Alcohol use 7.2 oz/week     12 Cans of beer, 0 Standard drinks or equivalent per week      Comment: 12 beers per week     Drug use: No     Sexual activity: Yes     Partners: Female     Birth control/ protection: None     Other Topics Concern     Parent/Sibling W/ Cabg, Mi Or Angioplasty Before 65f 55m? No      Service No     Blood Transfusions No     Caffeine Concern No     Occupational Exposure No     Hobby Hazards No     Sleep Concern No     Stress Concern No     Weight Concern No     Special Diet No     Back Care No     Exercise Yes     [employer] 12+ times per month     Seat Belt Yes     Self-Exams Yes     Social History Narrative       REVIEW OF SYSTEMS  =================  C: NEGATIVE for fever, chills, change in weight  I: NEGATIVE for worrisome rashes, moles or lesions  E/M:  NEGATIVE for ear, mouth and throat problems  R: NEGATIVE for significant cough or SHORTNESS OF BREATH,   CV: NEGATIVE for chest pain, palpitations or peripheral edema  GI: NEGATIVE for nausea, abdominal pain, heartburn, or change in bowel habits  NEURO: NEGATIVE any motor/sensory changes  PSYCH: NEGATIVE for recent mood disorder    Physical Exam:  BP (!) 156/83 (BP Location: Right arm, Patient Position: Chair, Cuff Size: Adult Regular)   Pulse 79   Resp 14    Patient is pleasant, in no acute distress, good general condition.  Lung: no evidence of respiratory distress    Abdomen: Soft, nondistended, non tender. No masses. No rebound or guarding.   Exam: no cva tenderness  Skin: Warm and dry.  No redness.  Psych: normal mood and affect  Neuro: alert and oriented    Assessment/Plan:   (C61) Prostate cancer (H)  (primary encounter diagnosis)  Comment: discussed pros and cons of cont hormonal therapy.  Plan:        Repeat psa in six months    (N39.46) Mixed incontinence urge and stress (male)(female)  Comment:  22 ml residualr   Plan: doing better.  Symptoms mainly urgency now.    Male ED: cont with penile injection    (N32.0) Bladder neck contracture  Comment: s/p BNC  Plan: prn     elevated bp: Ruslan to follow up with Primary Care provider regarding elevated blood pressure.

## 2019-12-13 ENCOUNTER — OFFICE VISIT (OUTPATIENT)
Dept: RADIATION ONCOLOGY | Facility: CLINIC | Age: 59
End: 2019-12-13
Payer: COMMERCIAL

## 2019-12-13 VITALS
SYSTOLIC BLOOD PRESSURE: 152 MMHG | WEIGHT: 227 LBS | DIASTOLIC BLOOD PRESSURE: 85 MMHG | OXYGEN SATURATION: 97 % | BODY MASS INDEX: 35.55 KG/M2 | HEART RATE: 93 BPM

## 2019-12-13 DIAGNOSIS — C61 MALIGNANT NEOPLASM OF PROSTATE (H): Primary | ICD-10-CM

## 2019-12-13 PROCEDURE — 99213 OFFICE O/P EST LOW 20 MIN: CPT | Performed by: RADIOLOGY

## 2019-12-13 ASSESSMENT — PAIN SCALES - GENERAL: PAINLEVEL: NO PAIN (0)

## 2019-12-13 NOTE — PATIENT INSTRUCTIONS
Please contact [facility] RN with questions or concerns following today's appointment: [phone].    Thank you!

## 2019-12-13 NOTE — NURSING NOTE
FOLLOW-UP VISIT    Patient Name: Estuardo Bowden      : 1960     Age: 59 year old        ______________________________________________________________________________     Chief Complaint   Patient presents with     Cancer     Radiation Oncology return visit with Dr. James     BP (!) 152/85 (BP Location: Left arm, Patient Position: Sitting, Cuff Size: Adult Large)   Pulse 93   Wt 103 kg (227 lb)   SpO2 97%   BMI 35.55 kg/m       Date Radiation Completed: 7,000 cGy to prostate completed on 2017    Pain  Denies    Labs  Other Labs: Yes: 2019 PSA    Imaging  None        PSA:   PSA   Date Value Ref Range Status   2019 <0.01 0 - 4 ug/L Final     Comment:     Assay Method:  Chemiluminescence using Siemens Vista analyzer   2019 <0.01 0 - 4 ug/L Final     Comment:     Assay Method:  Chemiluminescence using Siemens Vista analyzer   2018 <0.01 0 - 4 ug/L Final     Comment:     Assay Method:  Chemiluminescence using Siemens Vista analyzer   2018 <0.01 0 - 4 ug/L Final     Comment:     Assay Method:  Chemiluminescence using Siemens Vista analyzer   2017 <0.01 0 - 4 ug/L Final     Comment:     Assay Method:  Chemiluminescence using Siemens Vista analyzer   2017 0.28 0 - 4 ug/L Final     Comment:     Assay Method:  Chemiluminescence using Siemens Vista analyzer   10/27/2016 20.90 (H) 0 - 4 ug/L Final     Comment:     Assay Method:  Chemiluminescence using Siemens Vista analyzer   06/15/2016 15.69 (H) 0 - 4 ug/L Final   2016 16.62 (H) 0 - 4 ug/L Final   2015 11.40 (H) 0 - 4 ug/L Final     Testosterone Level: No results found for: TESTOSTTOTAL    On-Study AUA Symptom Score (PQ)  AUA (American Urological Association) Symptom Score  1. Over the past month, how often have you had a sensation of not emptying your bladder completely after you finished urinating?: Less than 1 time in 5  2. Over the past month, how often have you had to urinate again less than two  hours after you finished urinating?: Less than half the time  3. Over the past month, how often have you found you stopped and started again several times when you urinated?: Less than half the time  4. Over the past month, how often have you found it difficult to postpone urination?: Less than 1 time in 5  5. Over the past month, how often have you had a weak urine stream?: Not at all  6. Over the past month, how often have you had to push or strain to begin urinating?: Not at all  7. Over the past month, how many times did you typically get up to urinate from the time you went to bed at night until the time you got up in the morning?: 2 times  The Disease-specific Quality of Life Question: If you were to spend the rest of your life with your urinary condition just the way it is now, how would you feel about that? (highlight or underline): Unhappy  AUA Score: 8    Potency Score: 5      Diarrhea: 0- None    Constipation: 0- None      Other Appointments:     MD Name:  Appointment Date:    MD Name: Appointment Date:   MD Name: Appointment Date:       Nurse face-to-face time: Level 2:  5 min face to face time

## 2019-12-13 NOTE — PROGRESS NOTES
RADIATION ONCOLOGY FOLLOW-UP VISIT  DATE: 12/13/2019     NAME: Estuardo Bowden  MRN: [id number]     DISEASE TREATED: prostate cancer s/p prostatectomy, GS 4+4, pT3bN0, neg margins, PSA 0.28 postop (iPSA 20.9)     RADIATION THERAPY DELIVERED: Salvage RT to 70 Gy with long term ADT  Radiation was completied on 8/29/17     SUBJECTIVE:  Mr. Bowden is a 58 year old gentleman who completed radiation therapy for high risk prostate cancer per above. He returns to our clinic today for a routine follow-up. He had a bladder neck dilation in 4/2018 but has been doing well since. PSA on 9/5/18 was <0.01 and on 4/9/19 was <0.01. PSA on 11/20/2019 was <0.01 and seen by .  His energy level is good.  He has no side effects from eligard.  His urinary flow is good.  He is s/p bladder neck.  His flow is good.  He still has some mild stress incontinence.  He has no erections.   Viagra did not help. Penile injections work ok.  He is recieving Eligard injection  He is tolerating it well with mild hot flashes occasionally. His AUA score is 12 today, compared to 14 at last visit and 22 before that. He continues having mild incontinence that is improving slowly. He wears 1ppd (previously 2ppds); still doing Kegel exercises.        PHYSICAL EXAM:  VITALS: /82 (BP Location: Left arm, Patient Position: Sitting, Cuff Size: Adult Regular)   Pulse 88   Temp 99  F (37.2  C) (Oral)   Resp 16   Wt 103.1 kg (227 lb 3.2 oz)   SpO2 98%   BMI 35.58 kg/m    GEN: Appears well, alert, oriented, and in NAD  HEENT: EOMI, normal conjunctiva  CV: RRR, 1+ lower ext edema which is long standing and stable.   RESP: breathing comfortably on room air, CTAB  ABDOMEN: deferred  RECTAL: deferred given controlled PSA  SKIN: Normal color and turgor  NEURO: No focal deficits, normal gait  PSYCH: Appropriate mood and affect     LABS AND IMAGING: Reviewed          PSA   Date Value Ref Range Status   11/20/2019 <0.01 0 - 4 ug/L Final       Comment:        Assay Method:  Chemiluminescence using Siemens Vista analyzer         IMPRESSION/PLAN:   Mr. Bowden is a 59 year old male with high risk prostate cancer status post prostatectomy and salvage radiation. He is doing well with no biochemical evidence of recurrent disease; still on ADT. He will continue his follow up every 6 monhand also see Dr. Rust, Urology.    Thank you for letting [facility] participate on this patient care.    Dave James MD

## 2020-06-19 DIAGNOSIS — C61 PROSTATE CANCER (H): ICD-10-CM

## 2020-06-19 LAB — PSA SERPL-MCNC: <0.01 UG/L (ref 0–4)

## 2020-06-19 PROCEDURE — 36415 COLL VENOUS BLD VENIPUNCTURE: CPT | Performed by: UROLOGY

## 2020-06-19 PROCEDURE — 84153 ASSAY OF PSA TOTAL: CPT | Performed by: UROLOGY

## 2020-11-08 ENCOUNTER — HEALTH MAINTENANCE LETTER (OUTPATIENT)
Age: 60
End: 2020-11-08

## 2020-11-10 ENCOUNTER — VIRTUAL VISIT (OUTPATIENT)
Dept: FAMILY MEDICINE | Facility: OTHER | Age: 60
End: 2020-11-10

## 2020-11-10 NOTE — PROGRESS NOTES
"Date: 11/10/2020 10:13:35  Clinician: Heri Knight  Clinician NPI: [id number]  Patient: Estuardo Bodwen  Patient : 1960  Patient Address: [address] [place] [address]  Patient Phone: [phone]  Visit Protocol: URI  Patient Summary:  Estuardo is a 60 year old ( : 1960 ) male who initiated a [facility] Visit for COVID-19 (Coronavirus) evaluation and screening. When asked the question \"Please sign me up to receive news, health information and promotions from [facility].\", Estuardo responded \"No\".    Estuardo states his symptoms started gradually 5-6 days ago. After his symptoms started, they improved and then got worse again.   His symptoms consist of malaise, ageusia, diarrhea, a cough, and anosmia.   Symptom details   Cough: Estuardo coughs every 5-10 minutes and his cough is not more bothersome at night. Phlegm does not come into his throat when he coughs. He does not believe his cough is caused by post-nasal drip.    Estuardo denies having vomiting, rhinitis, facial pain or pressure, myalgias, chills, sore throat, teeth pain, ear pain, headache, wheezing, fever, nasal congestion, and nausea. He also denies taking antibiotic medication in the past month and having recent facial or sinus surgery in the past 60 days. He is not experiencing dyspnea.   Precipitating events  He has not recently been exposed to someone with influenza. Estuardo has been in close contact with the following high risk individuals: adults 65 or older.   Pertinent COVID-19 (Coronavirus) information  Estuardo does not work or volunteer as healthcare worker or a . In the past 14 days, Estuardo has not worked or volunteered at a healthcare facility or group living setting.   In the past 14 days, he also has not lived in a congregate living setting.   Estuardo has had a close contact with a laboratory-confirmed COVID-19 patient within 14 days of symptom onset. He was exposed at his work. He does not know when " he was exposed to the laboratory-confirmed COVID-19 patient.   Additional information about contact with COVID-19 (Coronavirus) patient as reported by the patient (free text): exposed at work by fellow employee wilfrido menjivar    Since December 2019, Estuardo has not been tested for COVID-19 and has had upper respiratory infection (URI) or influenza-like illness.      Date(s) of previous URI or influenza-like illness (free-text): 11/3 theu current date     Symptoms Estuardo experienced during previous URI or influenza-like illness as reported by the patient (free-text): cough        Pertinent medical history  Estuardo needs a return to work/school note.   Weight: 210 lbs   Estuardo smokes or uses smokeless tobacco.   Weight: 210 lbs    MEDICATIONS: No current medications, ALLERGIES: NKDA  Clinician Response:  Dear Estuardo,   Your symptoms show that you may have coronavirus (COVID-19). This illness can cause fever, cough and trouble breathing. Many people get a mild case and get better on their own. Some people can get very sick.  What should I do?  We would like to test you for this virus.   1. Please call [phone] to schedule your visit. Explain that you were referred by [facility] to have a COVID-19 test. Be ready to [facility].  * If you need to schedule in [facility] please call [phone] or for [employer] employees please call [phone].  * If you need to schedule in the [place] area please call [phone]. [employer] employees call [phone].  The following will serve as your written order for this COVID Test, ordered by me, for the indication of suspected COVID [Z20.828]: The test will be ordered in [facility], our electronic health record, after you are scheduled. It will show as ordered and authorized by Jaison James MD.  Order: COVID-19 (Coronavirus) PCR for SYMPTOMATIC testing from [facility].   2. When it's time for your COVID test:  Stay at least 6 feet away from others. (If someone  "will drive you to your test, stay in the backseat, as far away from the  as you can.)   Cover your mouth and nose with a mask, tissue or washcloth.  Go straight to the testing site. Don't make any stops on the way there or back.      3.Starting now: Stay home and away from others (self-isolate) until:   You've had no fever---and no medicine that reduces fever---for one full day (24 hours). And...   Your other symptoms have gotten better. For example, your cough or breathing has improved. And...   At least 10 days have passed since your symptoms started.       During this time, don't leave the house except for testing or medical care.   Stay in your own room, even for meals. Use your own bathroom if you can.   Stay away from others in your home. No hugging, kissing or shaking hands. No visitors.  Don't go to work, school or anywhere else.    Clean \"high touch\" surfaces often (doorknobs, counters, handles, etc.). Use a household cleaning spray or wipes. You'll find a full list of  on the EPA website: www.epa.gov/pesticide-registration/list-n-disinfectants-use-against-sars-cov-2.   Cover your mouth and nose with a mask, tissue or washcloth to avoid spreading germs.  Wash your hands and face often. Use soap and water.  Caregivers in these groups are at risk for severe illness due to COVID-19:  o People 65 years and older  o People who live in a nursing home or long-term care facility  o People with chronic disease (lung, heart, cancer, diabetes, kidney, liver, immunologic)  o People who have a weakened immune system, including those who:   Are in cancer treatment  Take medicine that weakens the immune system, such as corticosteroids  Had a bone marrow or organ transplant  Have an immune deficiency  Have poorly controlled HIV or AIDS  Are obese (body mass index of 40 or higher)  Smoke regularly   o Caregivers should wear gloves while washing dishes, handling laundry and cleaning bedrooms and bathrooms.  o " Use caution when washing and drying laundry: Don't shake dirty laundry, and use the warmest water setting that you can.  o For more tips, go to www.cdc.gov/coronavirus/2019-ncov/downloads/10Things.pdf.       How can I take care of myself?   Get lots of rest. Drink extra fluids (unless a doctor has told you not to).   Take Tylenol (acetaminophen) for fever or pain. If you have liver or kidney problems, ask your family doctor if it's okay to take Tylenol.   Adults can take either:    650 mg (two 325 mg pills) every 4 to 6 hours, or...   1,000 mg (two 500 mg pills) every 8 hours as needed.    Note: Don't take more than 3,000 mg in one day. Acetaminophen is found in many medicines (both prescribed and over-the-counter medicines). Read all labels to be sure you don't take too much.   For children, check the Tylenol bottle for the right dose. The dose is based on the child's age or weight.    If you have other health problems (like cancer, heart failure, an organ transplant or severe kidney disease): Call your specialty clinic if you don't feel better in the next 2 days.       Know when to call 911. Emergency warning signs include:    Trouble breathing or shortness of breath Pain or pressure in the chest that doesn't go away Feeling confused like you haven't felt before, or not being able to wake up Bluish-colored lips or face.  Where can I get more information?   [facility] -- About COVID-19: [email or web address]   CDC -- What to Do If You're Sick: www.cdc.gov/coronavirus/2019-ncov/about/steps-when-sick.html   CDC -- Ending Home Isolation: www.cdc.gov/coronavirus/2019-ncov/hcp/disposition-in-home-patients.html   CDC -- Caring for Someone: www.cdc.gov/coronavirus/2019-ncov/if-you-are-sick/care-for-someone.html   [facility] -- Interim Guidance for Hospital Discharge to Home: www.health.[facility].[place]/diseases/coronavirus/hcp/hospdischarge.pdf   [facility] clinical trials (COVID-19 research studies):  clinicalaffairs.[facility]-clinical-trials    Below are the COVID-19 hotlines at the [place] [facility] ([facility]). Interpreters are available.    For health questions: Call 411-430-7114 or 1-945.155.5743 (7 a.m. to 7 p.m.) For questions about schools and childcare: Call 539-014-5094 or 1-501.978.7627 (7 a.m. to 7 p.m.)    Diagnosis: Contact with and (suspected) exposure to other viral communicable diseases  Diagnosis ICD: Z20.828

## 2020-11-12 DIAGNOSIS — Z20.822 SUSPECTED COVID-19 VIRUS INFECTION: Primary | ICD-10-CM

## 2021-09-11 ENCOUNTER — HEALTH MAINTENANCE LETTER (OUTPATIENT)
Age: 61
End: 2021-09-11

## 2022-01-01 ENCOUNTER — HEALTH MAINTENANCE LETTER (OUTPATIENT)
Age: 62
End: 2022-01-01

## 2022-10-30 ENCOUNTER — HEALTH MAINTENANCE LETTER (OUTPATIENT)
Age: 62
End: 2022-10-30

## 2023-04-08 ENCOUNTER — HEALTH MAINTENANCE LETTER (OUTPATIENT)
Age: 63
End: 2023-04-08

## 2024-06-09 ENCOUNTER — HEALTH MAINTENANCE LETTER (OUTPATIENT)
Age: 64
End: 2024-06-09

## (undated) DEVICE — Device

## (undated) DEVICE — GLOVE PROTEXIS W/NEU-THERA 7.0  2D73TE70

## (undated) DEVICE — SOL WATER INJ 1000ML BAG 07990-09

## (undated) DEVICE — SOL WATER IRRIG 1000ML BOTTLE 07139-09

## (undated) DEVICE — GUIDEWIRE BENTSON FLEX TIP 0.035"X150CM M0066201250

## (undated) DEVICE — GOWN XLG DISP 9545

## (undated) DEVICE — GLOVE PROTEXIS BLUE W/NEU-THERA 7.5  2D73EB75